# Patient Record
Sex: MALE | Race: WHITE | NOT HISPANIC OR LATINO | Employment: FULL TIME | URBAN - METROPOLITAN AREA
[De-identification: names, ages, dates, MRNs, and addresses within clinical notes are randomized per-mention and may not be internally consistent; named-entity substitution may affect disease eponyms.]

---

## 2021-01-12 ENCOUNTER — TELEPHONE (OUTPATIENT)
Dept: ENDOCRINOLOGY | Facility: HOSPITAL | Age: 61
End: 2021-01-12

## 2021-01-12 NOTE — TELEPHONE ENCOUNTER
----- Message from Esme Merritt sent at 1/11/2021  4:24 PM EST -----  Patient would like to schedule a new patient appt    [phone]

## 2021-04-08 ENCOUNTER — OFFICE VISIT (OUTPATIENT)
Dept: ENDOCRINOLOGY | Facility: HOSPITAL | Age: 61
End: 2021-04-08
Payer: COMMERCIAL

## 2021-04-08 VITALS
WEIGHT: 235.8 LBS | HEART RATE: 82 BPM | SYSTOLIC BLOOD PRESSURE: 110 MMHG | DIASTOLIC BLOOD PRESSURE: 64 MMHG | HEIGHT: 73 IN | BODY MASS INDEX: 31.25 KG/M2

## 2021-04-08 DIAGNOSIS — E89.6 H/O TOTAL ADRENALECTOMY (HCC): ICD-10-CM

## 2021-04-08 DIAGNOSIS — E27.40 ADRENAL INSUFFICIENCY (HCC): ICD-10-CM

## 2021-04-08 DIAGNOSIS — E89.0 POSTOPERATIVE HYPOTHYROIDISM: Primary | ICD-10-CM

## 2021-04-08 DIAGNOSIS — E11.65 TYPE 2 DIABETES MELLITUS WITH HYPERGLYCEMIA, WITHOUT LONG-TERM CURRENT USE OF INSULIN (HCC): ICD-10-CM

## 2021-04-08 DIAGNOSIS — Z86.39 HISTORY OF MULTINODULAR GOITER: ICD-10-CM

## 2021-04-08 PROCEDURE — 99244 OFF/OP CNSLTJ NEW/EST MOD 40: CPT | Performed by: INTERNAL MEDICINE

## 2021-04-08 RX ORDER — HYDROCORTISONE 10 MG/1
30 TABLET ORAL DAILY
COMMUNITY
End: 2021-04-08 | Stop reason: SDUPTHER

## 2021-04-08 RX ORDER — CLOPIDOGREL BISULFATE 75 MG/1
75 TABLET ORAL DAILY
COMMUNITY

## 2021-04-08 RX ORDER — HYDROCORTISONE 10 MG/1
30 TABLET ORAL DAILY
Qty: 300 TABLET | Refills: 3 | Status: SHIPPED | OUTPATIENT
Start: 2021-04-08 | End: 2022-04-18

## 2021-04-08 RX ORDER — NITROGLYCERIN 0.4 MG/1
0.4 TABLET SUBLINGUAL
COMMUNITY

## 2021-04-08 RX ORDER — HYDROCORTISONE SOD SUCCINATE 100 MG
VIAL (EA) INJECTION
COMMUNITY
Start: 2021-01-11

## 2021-04-08 RX ORDER — PANTOPRAZOLE SODIUM 40 MG/1
40 TABLET, DELAYED RELEASE ORAL
COMMUNITY
End: 2021-10-11 | Stop reason: ALTCHOICE

## 2021-04-08 RX ORDER — FLUDROCORTISONE ACETATE 0.1 MG/1
0.1 TABLET ORAL DAILY
COMMUNITY
Start: 2021-03-25 | End: 2021-10-04 | Stop reason: SDUPTHER

## 2021-04-08 RX ORDER — ATORVASTATIN CALCIUM 40 MG/1
40 TABLET, FILM COATED ORAL DAILY
COMMUNITY

## 2021-04-08 RX ORDER — FUROSEMIDE 40 MG/1
40 TABLET ORAL DAILY
COMMUNITY
Start: 2021-03-31

## 2021-04-08 RX ORDER — LEVOTHYROXINE SODIUM 175 UG/1
175 TABLET ORAL DAILY
COMMUNITY
End: 2021-04-13 | Stop reason: DRUGHIGH

## 2021-04-08 RX ORDER — CARVEDILOL 12.5 MG/1
12.5 TABLET ORAL 2 TIMES DAILY WITH MEALS
COMMUNITY
End: 2021-10-11 | Stop reason: ALTCHOICE

## 2021-04-08 RX ORDER — EMPAGLIFLOZIN 10 MG/1
10 TABLET, FILM COATED ORAL EVERY MORNING
COMMUNITY
End: 2021-04-13 | Stop reason: SDUPTHER

## 2021-04-08 NOTE — PROGRESS NOTES
4/9/2021    Assessment/Plan      Diagnoses and all orders for this visit:    Postoperative hypothyroidism  -     Comprehensive metabolic panel Lab Collect  -     TSH, 3rd generation Lab Collect  -     T4, free Lab Collect  -     CBC and differential Lab Collect  -     Comprehensive metabolic panel Lab Collect; Future  -     CBC and differential Lab Collect; Future  -     TSH, 3rd generation Lab Collect; Future  -     T4, free Lab Collect; Future  -     Comprehensive metabolic panel Lab Collect  -     CBC and differential Lab Collect  -     TSH, 3rd generation Lab Collect  -     T4, free Lab Collect    Adrenal insufficiency (HCC)  -     Comprehensive metabolic panel Lab Collect  -     TSH, 3rd generation Lab Collect  -     T4, free Lab Collect  -     CBC and differential Lab Collect  -     Comprehensive metabolic panel Lab Collect; Future  -     CBC and differential Lab Collect; Future  -     TSH, 3rd generation Lab Collect; Future  -     T4, free Lab Collect; Future  -     Comprehensive metabolic panel Lab Collect  -     CBC and differential Lab Collect  -     TSH, 3rd generation Lab Collect  -     T4, free Lab Collect  -     hydrocortisone (CORTEF) 10 mg tablet; Take 3 tablets (30 mg total) by mouth daily Double if ill  Type 2 diabetes mellitus with hyperglycemia, without long-term current use of insulin (HCC)  -     Comprehensive metabolic panel Lab Collect  -     TSH, 3rd generation Lab Collect  -     T4, free Lab Collect  -     CBC and differential Lab Collect  -     Comprehensive metabolic panel Lab Collect; Future  -     CBC and differential Lab Collect; Future  -     TSH, 3rd generation Lab Collect; Future  -     T4, free Lab Collect;  Future  -     Comprehensive metabolic panel Lab Collect  -     CBC and differential Lab Collect  -     TSH, 3rd generation Lab Collect  -     T4, free Lab Collect    H/O total adrenalectomy [facility])  -     Comprehensive metabolic panel Lab Collect  -     TSH, 3rd generation Lab Collect  -     T4, free Lab Collect  -     CBC and differential Lab Collect  -     Comprehensive metabolic panel Lab Collect; Future  -     CBC and differential Lab Collect; Future  -     TSH, 3rd generation Lab Collect; Future  -     T4, free Lab Collect; Future  -     Comprehensive metabolic panel Lab Collect  -     CBC and differential Lab Collect  -     TSH, 3rd generation Lab Collect  -     T4, free Lab Collect  -     hydrocortisone (CORTEF) 10 mg tablet; Take 3 tablets (30 mg total) by mouth daily Double if ill  History of multinodular goiter  -     Comprehensive metabolic panel Lab Collect  -     TSH, 3rd generation Lab Collect  -     T4, free Lab Collect  -     CBC and differential Lab Collect  -     Comprehensive metabolic panel Lab Collect; Future  -     CBC and differential Lab Collect; Future  -     TSH, 3rd generation Lab Collect; Future  -     T4, free Lab Collect; Future  -     Comprehensive metabolic panel Lab Collect  -     CBC and differential Lab Collect  -     TSH, 3rd generation Lab Collect  -     T4, free Lab Collect    Other orders  -     Aspirin Buf,CaCarb-MgCarb-MgO, 81 MG TABS; Take 81 mg by mouth daily  -     atorvastatin (LIPITOR) 40 mg tablet; Take 40 mg by mouth daily  -     clopidogrel (PLAVIX) 75 mg tablet; Take 75 mg by mouth daily  -     fludrocortisone (FLORINEF) 0 1 mg tablet; Take 0 1 mg by mouth daily  -     furosemide (LASIX) 40 mg tablet; Take 40 mg by mouth daily  -     hydrocortisone sodium succinate, PF, (Solu-CORTEF) 100 mg SOLR; Inject 100mg (2mL) IM in case of severe vomiting illness or adrenal crisis  Dispense ACT-O-VIAL ONLY  -     pantoprazole (PROTONIX) 40 mg tablet; Take 40 mg by mouth  -     nitroglycerin (NITROSTAT) 0 4 mg SL tablet; Place 0 4 mg under the tongue  -     carvedilol (COREG) 12 5 mg tablet;  Take 12 5 mg by mouth 2 (two) times a day with meals  -     Empagliflozin (Jardiance) 10 MG TABS; Take 10 mg by mouth every morning  -     levothyroxine 175 mcg tablet; Take 175 mcg by mouth daily  -     metFORMIN (GLUCOPHAGE) 1000 MG tablet; Take 1,000 mg by mouth 2 (two) times a day with meals  -     Discontinue: hydrocortisone (CORTEF) 10 mg tablet; Take 30 mg by mouth daily        Assessment/Plan:   1  Adrenal insufficiency post bilateral adrenalectomy  He is hemodynamically stable  I will have him get blood work done now and he will continue the same hydrocortisone and Florinef for now  We went over sick day rules and when to go to the emergency room  We discussed increasing salt intake in the summer if needed when he has salt cravings due to increase in diaphoresis in the heat  He was recommended to get a new medic Alert bracelet or necklace same he has adrenal insufficiency as his previous 1 has broken  2  Hypothyroidism post thyroidectomy  He is clinically euthyroid and will continue the same levothyroxine 175 mcg daily  I will check his thyroid blood work now  3  Type 2 diabetes  He is following with his primary care physician regarding his type 2 diabetes and takes metformin  He did not continue on Jardiance due to cost issues  He will get blood work done now consisting of a CMP, CBC, TSH, and free T4  I have asked him to follow up in 6 months with blood work to be determined  But likely a CBC, CMP, TSH, and free T4       CC:   Hypothyroid, adrenal insufficiency consult    History of Present Illness     HPI: Timi Mclaughlin is a 61y o  year old male with history of  Hypothyroidism post thyroidectomy and adrenal insufficiency post bilateral adrenalectomy for evaluation/consult  He had been seeing an endocrinologist via the [facility] but had a change in insurance and could no longer follow up with this particular physician  He is here for transition of care  He had a multinodular goiter many years ago that were being monitored over time  In  January 2014, he underwent FNA which showed benign nodules  At that point, he began have hoarse voice and difficulty with swallowing so underwent a near-total thyroidectomy in March 2014  The pathology did end up being benign and he has been on thyroid hormone replacement ever since  He is currently on levothyroxine 175 mcg daily  He had been on both 200 mcg and 250 mcg tablets in the past   He denies heat or cold intolerance but will sweat easily when outside  He denies palpitation, tremors, diarrhea or constipation, insomnia, anxiety or depression  He does have some fatigue  Weight is stable  He denies diplopia  He denies compressive thyroid symptoms or difficulties with swallowing  he was noted on a CT scan in 2009 to have pulmonary lesions and bilateral adrenal masses  These masses initially group but then decreased in size and they were just being followed over time with serial studies  An MRI in December 2014 showed a left adrenal mass of 8 5 cm and a right adrenal mass of 7 7 cm both suspicious for  Metastatic disease and had enlarged significantly from a 2011 study  A biopsy of the right adrenal mass in February 2015 showed the lesion was metastatic clear cell renal carcinoma  He then underwent bilateral adrenalectomy in April 2015 along with removing  Any other metastatic disease  Of note, he had had a right kidney mass in 2007 that was 11 cm clear cell renal cell carcinoma removed via nephrectomy  He is now taking Florinef 0 1 mg daily and hydrocortisone 10 mg 3 tablets in the morning  At 1 point, he was taking 2 tablets in the morning and 1 tablet later in the day but would forget his evening dose so just switched all 3 tablets in the morning  He denies orthostatics symptoms  He denies any current lower extremity edema  He did get some lower extremity edema in the warm months when he was much more diaphoretic and would salt craving and drink V 8 juice with salt in it  He does have some fatigue  He denies nausea or abdominal pain    His skin is hyperpigmented in the sun-exposed areas but not so in the non sun-exposed areas  He reports that his skin does dark in quite a bit in the summer  He has type 2 diabetes followed by his primary care physician and currently takes metformin 1000 mg twice a day  He was started on Jardiance 10 mg daily but had to discontinue that due to cost issues  Review of Systems   Constitutional: Positive for fatigue  Negative for unexpected weight change  Has fatigue, will need coffee and HC to get going  In the summer, was retaining water as was putting salt in the V8 juice, was craving it in summer  Weight can fluctuate with edema and salt levels  HENT: Negative for hearing loss, tinnitus and trouble swallowing  Eyes: Negative for visual disturbance  No diplopia  Wears glasses for reading  Respiratory: Negative for chest tightness and shortness of breath  Cardiovascular: Negative for chest pain, palpitations and leg swelling  Gastrointestinal: Negative for abdominal pain, constipation, diarrhea and nausea  Endocrine: Positive for polyuria  Negative for cold intolerance, heat intolerance, polydipsia and polyphagia  Polyuria after diuretic in am  Nocturia 1-2 times a night  Has dry mouth at night  will sweat easily when outside  Musculoskeletal: Negative for arthralgias and back pain  Skin: Negative for rash and wound  Can get very dark in the summer of the skin  Neurological: Negative for dizziness, tremors, weakness, light-headedness, numbness and headaches  No orthostatic symptoms  Psychiatric/Behavioral: Negative for dysphoric mood and sleep disturbance  The patient is not nervous/anxious          Historical Information   Past Medical History:   Diagnosis Date    Clear cell renal cell carcinoma, right (HCC)     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    Coronary artery disease     Heart attack ([facility] [address] )     twice    Hodgkin lymphoma ([facility] [address] )     at age 32, had chemo and XRT    Hyperlipidemia      Past Surgical History:   Procedure Laterality Date    ADRENALECTOMY Bilateral     CARDIAC DEFIBRILLATOR PLACEMENT      and pacemaker    CORONARY ANGIOPLASTY WITH STENT PLACEMENT      2 stents    CORONARY ARTERY BYPASS GRAFT      X 2    NEPHRECTOMY Right     TOTAL THYROIDECTOMY       Social History   Social History     Substance and Sexual Activity   Alcohol Use Yes    Frequency: 2-4 times a month     Social History     Substance and Sexual Activity   Drug Use Never     Social History     Tobacco Use   Smoking Status Former Smoker    Years: 12 00    Types: Cigarettes    Quit date: 80    Years since quittin 2   Smokeless Tobacco Never Used     Family History:   Family History   Problem Relation Age of Onset    Heart disease Mother         has a pacemaker    Cancer Father     Diabetes type II Father     Heart disease Sister     Diabetes type II Brother     No Known Problems Brother     No Known Problems Son        Meds/Allergies   Current Outpatient Medications   Medication Sig Dispense Refill    Aspirin Buf,CaCarb-MgCarb-MgO, 81 MG TABS Take 81 mg by mouth daily      atorvastatin (LIPITOR) 40 mg tablet Take 40 mg by mouth daily      carvedilol (COREG) 12 5 mg tablet Take 12 5 mg by mouth 2 (two) times a day with meals      clopidogrel (PLAVIX) 75 mg tablet Take 75 mg by mouth daily      Empagliflozin (Jardiance) 10 MG TABS Take 10 mg by mouth every morning      fludrocortisone (FLORINEF) 0 1 mg tablet Take 0 1 mg by mouth daily      furosemide (LASIX) 40 mg tablet Take 40 mg by mouth daily      hydrocortisone (CORTEF) 10 mg tablet Take 3 tablets (30 mg total) by mouth daily Double if ill  300 tablet 3    hydrocortisone sodium succinate, PF, (Solu-CORTEF) 100 mg SOLR Inject 100mg (2mL) IM in case of severe vomiting illness or adrenal crisis    Dispense ACT-O-VIAL ONLY      levothyroxine 175 mcg tablet Take 175 mcg by mouth daily      metFORMIN (GLUCOPHAGE) 1000 MG tablet Take 1,000 mg by mouth 2 (two) times a day with meals      nitroglycerin (NITROSTAT) 0 4 mg SL tablet Place 0 4 mg under the tongue      pantoprazole (PROTONIX) 40 mg tablet Take 40 mg by mouth       No current facility-administered medications for this visit  No Known Allergies    Objective   Vitals: Blood pressure 110/64, pulse 82, height 6' 1" (1 854 m), weight 107 kg (235 lb 12 8 oz)  Invasive Devices     None                 Physical Exam  Vitals signs reviewed  Constitutional:       Appearance: Normal appearance  He is well-developed  He is obese  HENT:      Head: Normocephalic and atraumatic  Eyes:      Extraocular Movements: Extraocular movements intact  Conjunctiva/sclera: Conjunctivae normal       Comments: No lid lag, stare, proptosis, or periorbital edema  Neck:      Musculoskeletal: Normal range of motion and neck supple  Thyroid: No thyromegaly  Vascular: No carotid bruit  Comments: Healed anterior neck scar  No palpable thyroid tissue  No lymphadenopathy  Cardiovascular:      Rate and Rhythm: Normal rate and regular rhythm  Heart sounds: Normal heart sounds  No murmur  Pulmonary:      Effort: Pulmonary effort is normal       Breath sounds: Normal breath sounds  No wheezing  Abdominal:      General: Bowel sounds are normal       Palpations: Abdomen is soft  Tenderness: There is no abdominal tenderness  Musculoskeletal: Normal range of motion  General: No deformity  Right lower leg: No edema  Left lower leg: No edema  Comments: No tremor of the outstretched hands  No spinous process tenderness  No CVA tenderness  Lymphadenopathy:      Cervical: No cervical adenopathy  Skin:     General: Skin is warm and dry  Findings: No erythema or rash  Comments: Skin very dark in the sun-exposed areas  Skin lighter on the abdomen which is not sun-exposed     Neurological:      Mental Status: He is alert and oriented to person, place, and time  Deep Tendon Reflexes: Reflexes are normal and symmetric  Comments:  Deep tendon reflexes normal          The history was obtained from the review of the chart and from the patient  Lab Results:        I have no recent blood work so I have asked him to get blood work drawn as soon as possible        Future Appointments   Date Time Provider Kelly Lee   10/11/2021 10:20 AM Jeni Greer, [address] [facility]

## 2021-04-08 NOTE — PATIENT INSTRUCTIONS
Let's get blood work done now  Continue the same florinef and hydrocortisone for now  Work on getting a new medic alert necklace or bracelet for adrenal insufficiency  Continue the same levothyroxine 175 mcg daily  Follow up in 6 months with blood work

## 2021-04-09 LAB
ALBUMIN SERPL-MCNC: 4.6 G/DL (ref 3.8–4.9)
ALBUMIN/GLOB SERPL: 1.7 {RATIO} (ref 1.2–2.2)
ALP SERPL-CCNC: 60 IU/L (ref 39–117)
ALT SERPL-CCNC: 21 IU/L (ref 0–44)
AST SERPL-CCNC: 21 IU/L (ref 0–40)
BASOPHILS # BLD AUTO: 0.1 X10E3/UL (ref 0–0.2)
BASOPHILS NFR BLD AUTO: 1 %
BILIRUB SERPL-MCNC: 0.8 MG/DL (ref 0–1.2)
BUN SERPL-MCNC: 18 MG/DL (ref 8–27)
BUN/CREAT SERPL: 15 (ref 10–24)
CALCIUM SERPL-MCNC: 9 MG/DL (ref 8.6–10.2)
CHLORIDE SERPL-SCNC: 96 MMOL/L (ref 96–106)
CO2 SERPL-SCNC: 26 MMOL/L (ref 20–29)
CREAT SERPL-MCNC: 1.23 MG/DL (ref 0.76–1.27)
EOSINOPHIL # BLD AUTO: 0.2 X10E3/UL (ref 0–0.4)
EOSINOPHIL NFR BLD AUTO: 2 %
ERYTHROCYTE [DISTWIDTH] IN BLOOD BY AUTOMATED COUNT: 13.3 % (ref 11.6–15.4)
GLOBULIN SER-MCNC: 2.7 G/DL (ref 1.5–4.5)
GLUCOSE SERPL-MCNC: 283 MG/DL (ref 65–99)
HCT VFR BLD AUTO: 44.1 % (ref 37.5–51)
HGB BLD-MCNC: 15.2 G/DL (ref 13–17.7)
IMM GRANULOCYTES # BLD: 0 X10E3/UL (ref 0–0.1)
IMM GRANULOCYTES NFR BLD: 0 %
LYMPHOCYTES # BLD AUTO: 1.7 X10E3/UL (ref 0.7–3.1)
LYMPHOCYTES NFR BLD AUTO: 17 %
MCH RBC QN AUTO: 30.6 PG (ref 26.6–33)
MCHC RBC AUTO-ENTMCNC: 34.5 G/DL (ref 31.5–35.7)
MCV RBC AUTO: 89 FL (ref 79–97)
MONOCYTES # BLD AUTO: 0.4 X10E3/UL (ref 0.1–0.9)
MONOCYTES NFR BLD AUTO: 4 %
NEUTROPHILS # BLD AUTO: 7.4 X10E3/UL (ref 1.4–7)
NEUTROPHILS NFR BLD AUTO: 76 %
PLATELET # BLD AUTO: 275 X10E3/UL (ref 150–450)
POTASSIUM SERPL-SCNC: 4.7 MMOL/L (ref 3.5–5.2)
PROT SERPL-MCNC: 7.3 G/DL (ref 6–8.5)
RBC # BLD AUTO: 4.97 X10E6/UL (ref 4.14–5.8)
SL AMB EGFR AFRICAN AMERICAN: 73 ML/MIN/1.73
SL AMB EGFR NON AFRICAN AMERICAN: 63 ML/MIN/1.73
SODIUM SERPL-SCNC: 137 MMOL/L (ref 134–144)
T4 FREE SERPL-MCNC: 0.25 NG/DL (ref 0.82–1.77)
TSH SERPL DL<=0.005 MIU/L-ACNC: 30.4 UIU/ML (ref 0.45–4.5)
WBC # BLD AUTO: 9.9 X10E3/UL (ref 3.4–10.8)

## 2021-04-13 DIAGNOSIS — E89.0 POSTOPERATIVE HYPOTHYROIDISM: Primary | ICD-10-CM

## 2021-04-13 DIAGNOSIS — E11.65 TYPE 2 DIABETES MELLITUS WITH HYPERGLYCEMIA, WITHOUT LONG-TERM CURRENT USE OF INSULIN (HCC): ICD-10-CM

## 2021-04-13 RX ORDER — EMPAGLIFLOZIN 10 MG/1
10 TABLET, FILM COATED ORAL EVERY MORNING
Qty: 30 TABLET | Refills: 5 | Status: SHIPPED | OUTPATIENT
Start: 2021-04-13 | End: 2021-09-27

## 2021-04-13 RX ORDER — LEVOTHYROXINE SODIUM 0.2 MG/1
200 TABLET ORAL DAILY
Qty: 90 TABLET | Refills: 1 | Status: SHIPPED | OUTPATIENT
Start: 2021-04-13 | End: 2021-06-07 | Stop reason: SDUPTHER

## 2021-04-19 ENCOUNTER — TELEPHONE (OUTPATIENT)
Dept: ENDOCRINOLOGY | Facility: HOSPITAL | Age: 61
End: 2021-04-19

## 2021-04-19 NOTE — TELEPHONE ENCOUNTER
jardiance will make you urinate more and can lower the blood pressure so the cardiologist may need to adjust his blood pressure medicines  Did he stop the metformin?

## 2021-04-19 NOTE — TELEPHONE ENCOUNTER
Blood sugar was 318 this morning at 10:30am  Pt would like advice  He just came home from cardiology and they said bp was low  Patient is clamy and peeing more than normal  Patient started jardiance last Friday

## 2021-04-19 NOTE — TELEPHONE ENCOUNTER
Okay, good, can he check his blood sugars at least twice a day and call in a blood sugar record by next week so I can get a better idea of what his blood sugars do throughout the day  I would like him to check before and 2 hours after a meal and then to check a different meal  This way every day

## 2021-04-19 NOTE — TELEPHONE ENCOUNTER
Patient aware  He did not stop the metformin  He said he takes them both together  He said to let you know he is going to have you manage his DM for him

## 2021-04-27 ENCOUNTER — TELEPHONE (OUTPATIENT)
Dept: ENDOCRINOLOGY | Facility: HOSPITAL | Age: 61
End: 2021-04-27

## 2021-04-27 DIAGNOSIS — E11.65 TYPE 2 DIABETES MELLITUS WITH HYPERGLYCEMIA, WITHOUT LONG-TERM CURRENT USE OF INSULIN (HCC): Primary | ICD-10-CM

## 2021-04-27 RX ORDER — GLIMEPIRIDE 4 MG/1
4 TABLET ORAL
Qty: 90 TABLET | Refills: 1 | Status: SHIPPED | OUTPATIENT
Start: 2021-04-27 | End: 2021-10-11 | Stop reason: ALTCHOICE

## 2021-04-27 NOTE — TELEPHONE ENCOUNTER
Patient is getting a pacemaker on 5/5    He wants to know if you want to adjust his cortisol    Please advise

## 2021-04-27 NOTE — TELEPHONE ENCOUNTER
He needs to double his cortisol the day of ant the day after the pacemaker is placed  He can stay on the same florinef

## 2021-04-27 NOTE — TELEPHONE ENCOUNTER
Pt informed   He would like 90 day script of glimepiride sent to [id number]

## 2021-05-04 ENCOUNTER — TELEPHONE (OUTPATIENT)
Dept: ENDOCRINOLOGY | Facility: HOSPITAL | Age: 61
End: 2021-05-04

## 2021-05-11 ENCOUNTER — TELEPHONE (OUTPATIENT)
Dept: ENDOCRINOLOGY | Facility: HOSPITAL | Age: 61
End: 2021-05-11

## 2021-05-11 NOTE — TELEPHONE ENCOUNTER
His blood sugars are good on his blood sugar record from 5/5/2021 through 5/11/2021  Did he check his blood sugars when he is experiencing these symptoms in the morning?

## 2021-05-11 NOTE — TELEPHONE ENCOUNTER
Spoke to patient  He notes that he has been recording his blood sugars when this happens  He is going to write them down and call them in on Friday   He notes that his mother passed away on Sunday so he thinks this might be related to stress

## 2021-05-11 NOTE — TELEPHONE ENCOUNTER
Received call from patient  He is experiencing hot flashes, dizziness and nausea in the morning which he is questioning if this is from the Glimepiride  Blood sugar logs are on your desk as well

## 2021-05-13 ENCOUNTER — TELEPHONE (OUTPATIENT)
Dept: ENDOCRINOLOGY | Facility: HOSPITAL | Age: 61
End: 2021-05-13

## 2021-05-13 NOTE — TELEPHONE ENCOUNTER
Received voicemail from patient  He is questioning if he should take extra hydrocortisone when stressed; he reports his mother passed away this past Sunday  Please advise

## 2021-05-13 NOTE — TELEPHONE ENCOUNTER
As long as he is feeling ok, no need to increase hydrocortisone for stress, only illness  Low cortisol/adrenal can cause lightheadedness when standing up and low blood pressure and decrease in appetite, nausea  If he has these, he can double for 1-2 days

## 2021-05-25 NOTE — TELEPHONE ENCOUNTER
Call patient  I reviewed her blood sugar record from 05/17/2021 through 05/25/2021  Blood sugar record is reasonable with blood sugars anywhere from the low 100s the high 100s  At this point, I would not decrease any further diabetes medicine such as metformin or Jardiance as his blood sugars are not low enough to do that as yet  Continue these 2 medicines the same

## 2021-05-25 NOTE — TELEPHONE ENCOUNTER
Patient called in his sugars  He stopped taking his Glimepiride on 5/16 and would like you to reduce his Metformin dose   Sugars are in your bin

## 2021-06-07 DIAGNOSIS — E89.0 POSTOPERATIVE HYPOTHYROIDISM: ICD-10-CM

## 2021-06-07 RX ORDER — LEVOTHYROXINE SODIUM 0.2 MG/1
200 TABLET ORAL DAILY
Qty: 90 TABLET | Refills: 1 | Status: SHIPPED | OUTPATIENT
Start: 2021-06-07 | End: 2022-03-21

## 2021-07-14 DIAGNOSIS — E11.65 TYPE 2 DIABETES MELLITUS WITH HYPERGLYCEMIA, WITHOUT LONG-TERM CURRENT USE OF INSULIN (HCC): Primary | ICD-10-CM

## 2021-09-27 DIAGNOSIS — E11.65 TYPE 2 DIABETES MELLITUS WITH HYPERGLYCEMIA, WITHOUT LONG-TERM CURRENT USE OF INSULIN (HCC): ICD-10-CM

## 2021-09-27 RX ORDER — EMPAGLIFLOZIN 10 MG/1
TABLET, FILM COATED ORAL
Qty: 30 TABLET | Refills: 5 | Status: SHIPPED | OUTPATIENT
Start: 2021-09-27 | End: 2022-03-21

## 2021-10-04 DIAGNOSIS — E27.40 ADRENAL INSUFFICIENCY (HCC): Primary | ICD-10-CM

## 2021-10-04 RX ORDER — FLUDROCORTISONE ACETATE 0.1 MG/1
0.1 TABLET ORAL DAILY
Qty: 90 TABLET | Refills: 0 | Status: SHIPPED | OUTPATIENT
Start: 2021-10-04 | End: 2021-12-15

## 2021-10-08 ENCOUNTER — TELEPHONE (OUTPATIENT)
Dept: ENDOCRINOLOGY | Facility: HOSPITAL | Age: 61
End: 2021-10-08

## 2021-10-09 LAB
ALBUMIN SERPL-MCNC: 4.2 G/DL (ref 3.8–4.9)
ALBUMIN/GLOB SERPL: 1.1 {RATIO} (ref 1.2–2.2)
ALP SERPL-CCNC: 90 IU/L (ref 44–121)
ALT SERPL-CCNC: 17 IU/L (ref 0–44)
AST SERPL-CCNC: 24 IU/L (ref 0–40)
BASOPHILS # BLD AUTO: 0 X10E3/UL (ref 0–0.2)
BASOPHILS NFR BLD AUTO: 0 %
BILIRUB SERPL-MCNC: 0.8 MG/DL (ref 0–1.2)
BUN SERPL-MCNC: 31 MG/DL (ref 8–27)
BUN/CREAT SERPL: 25 (ref 10–24)
CALCIUM SERPL-MCNC: 9.9 MG/DL (ref 8.6–10.2)
CHLORIDE SERPL-SCNC: 91 MMOL/L (ref 96–106)
CO2 SERPL-SCNC: 22 MMOL/L (ref 20–29)
CREAT SERPL-MCNC: 1.23 MG/DL (ref 0.76–1.27)
EOSINOPHIL # BLD AUTO: 0.2 X10E3/UL (ref 0–0.4)
EOSINOPHIL NFR BLD AUTO: 2 %
ERYTHROCYTE [DISTWIDTH] IN BLOOD BY AUTOMATED COUNT: 12.9 % (ref 11.6–15.4)
GLOBULIN SER-MCNC: 3.9 G/DL (ref 1.5–4.5)
GLUCOSE SERPL-MCNC: 126 MG/DL (ref 65–99)
HCT VFR BLD AUTO: 46.4 % (ref 37.5–51)
HGB BLD-MCNC: 15.7 G/DL (ref 13–17.7)
IMM GRANULOCYTES # BLD: 0 X10E3/UL (ref 0–0.1)
IMM GRANULOCYTES NFR BLD: 0 %
LYMPHOCYTES # BLD AUTO: 1.5 X10E3/UL (ref 0.7–3.1)
LYMPHOCYTES NFR BLD AUTO: 14 %
MCH RBC QN AUTO: 29.5 PG (ref 26.6–33)
MCHC RBC AUTO-ENTMCNC: 33.8 G/DL (ref 31.5–35.7)
MCV RBC AUTO: 87 FL (ref 79–97)
MONOCYTES # BLD AUTO: 0.6 X10E3/UL (ref 0.1–0.9)
MONOCYTES NFR BLD AUTO: 6 %
NEUTROPHILS # BLD AUTO: 8 X10E3/UL (ref 1.4–7)
NEUTROPHILS NFR BLD AUTO: 78 %
PLATELET # BLD AUTO: 364 X10E3/UL (ref 150–450)
POTASSIUM SERPL-SCNC: 4.8 MMOL/L (ref 3.5–5.2)
PROT SERPL-MCNC: 8.1 G/DL (ref 6–8.5)
RBC # BLD AUTO: 5.33 X10E6/UL (ref 4.14–5.8)
SL AMB EGFR AFRICAN AMERICAN: 73 ML/MIN/1.73
SL AMB EGFR NON AFRICAN AMERICAN: 63 ML/MIN/1.73
SODIUM SERPL-SCNC: 131 MMOL/L (ref 134–144)
T4 FREE SERPL-MCNC: 2.18 NG/DL (ref 0.82–1.77)
TSH SERPL DL<=0.005 MIU/L-ACNC: 0.08 UIU/ML (ref 0.45–4.5)
WBC # BLD AUTO: 10.4 X10E3/UL (ref 3.4–10.8)

## 2021-10-11 ENCOUNTER — OFFICE VISIT (OUTPATIENT)
Dept: ENDOCRINOLOGY | Facility: HOSPITAL | Age: 61
End: 2021-10-11
Payer: COMMERCIAL

## 2021-10-11 VITALS
SYSTOLIC BLOOD PRESSURE: 116 MMHG | BODY MASS INDEX: 27.96 KG/M2 | HEART RATE: 70 BPM | WEIGHT: 211 LBS | DIASTOLIC BLOOD PRESSURE: 68 MMHG | HEIGHT: 73 IN

## 2021-10-11 DIAGNOSIS — E27.40 ADRENAL INSUFFICIENCY (HCC): ICD-10-CM

## 2021-10-11 DIAGNOSIS — E11.65 TYPE 2 DIABETES MELLITUS WITH HYPERGLYCEMIA, WITHOUT LONG-TERM CURRENT USE OF INSULIN (HCC): Primary | ICD-10-CM

## 2021-10-11 DIAGNOSIS — E89.0 POSTOPERATIVE HYPOTHYROIDISM: ICD-10-CM

## 2021-10-11 DIAGNOSIS — E89.6 H/O TOTAL ADRENALECTOMY (HCC): ICD-10-CM

## 2021-10-11 DIAGNOSIS — Z86.39 HISTORY OF MULTINODULAR GOITER: ICD-10-CM

## 2021-10-11 DIAGNOSIS — E78.2 MIXED HYPERLIPIDEMIA: ICD-10-CM

## 2021-10-11 LAB — SL AMB POCT HEMOGLOBIN AIC: 6.3 (ref ?–6.5)

## 2021-10-11 PROCEDURE — 83036 HEMOGLOBIN GLYCOSYLATED A1C: CPT | Performed by: INTERNAL MEDICINE

## 2021-10-11 PROCEDURE — 99215 OFFICE O/P EST HI 40 MIN: CPT | Performed by: INTERNAL MEDICINE

## 2021-10-11 RX ORDER — BISOPROLOL FUMARATE 5 MG/1
5 TABLET ORAL DAILY
COMMUNITY

## 2021-10-11 RX ORDER — FLASH GLUCOSE SENSOR
KIT MISCELLANEOUS
Qty: 2 EACH | Refills: 6 | Status: SHIPPED | OUTPATIENT
Start: 2021-10-11

## 2021-12-15 DIAGNOSIS — E27.40 ADRENAL INSUFFICIENCY (HCC): ICD-10-CM

## 2021-12-15 RX ORDER — FLUDROCORTISONE ACETATE 0.1 MG/1
0.1 TABLET ORAL DAILY
Qty: 30 TABLET | Refills: 2 | Status: SHIPPED | OUTPATIENT
Start: 2021-12-15 | End: 2022-03-02

## 2021-12-23 DIAGNOSIS — E11.65 TYPE 2 DIABETES MELLITUS WITH HYPERGLYCEMIA, WITHOUT LONG-TERM CURRENT USE OF INSULIN (HCC): ICD-10-CM

## 2022-03-02 DIAGNOSIS — E27.40 ADRENAL INSUFFICIENCY (HCC): ICD-10-CM

## 2022-03-02 RX ORDER — FLUDROCORTISONE ACETATE 0.1 MG/1
TABLET ORAL
Qty: 30 TABLET | Refills: 2 | Status: SHIPPED | OUTPATIENT
Start: 2022-03-02 | End: 2022-06-13

## 2022-03-19 DIAGNOSIS — E11.65 TYPE 2 DIABETES MELLITUS WITH HYPERGLYCEMIA, WITHOUT LONG-TERM CURRENT USE OF INSULIN (HCC): ICD-10-CM

## 2022-03-19 DIAGNOSIS — E89.0 POSTOPERATIVE HYPOTHYROIDISM: ICD-10-CM

## 2022-03-21 RX ORDER — LEVOTHYROXINE SODIUM 0.2 MG/1
TABLET ORAL
Qty: 90 TABLET | Refills: 1 | Status: SHIPPED | OUTPATIENT
Start: 2022-03-21 | End: 2022-04-15 | Stop reason: SDUPTHER

## 2022-03-21 RX ORDER — EMPAGLIFLOZIN 10 MG/1
TABLET, FILM COATED ORAL
Qty: 30 TABLET | Refills: 5 | Status: SHIPPED | OUTPATIENT
Start: 2022-03-21

## 2022-04-12 ENCOUNTER — OFFICE VISIT (OUTPATIENT)
Dept: ENDOCRINOLOGY | Facility: HOSPITAL | Age: 62
End: 2022-04-12
Payer: COMMERCIAL

## 2022-04-12 VITALS
WEIGHT: 224 LBS | BODY MASS INDEX: 29.69 KG/M2 | SYSTOLIC BLOOD PRESSURE: 114 MMHG | HEART RATE: 80 BPM | DIASTOLIC BLOOD PRESSURE: 70 MMHG | HEIGHT: 73 IN

## 2022-04-12 DIAGNOSIS — E78.2 MIXED HYPERLIPIDEMIA: ICD-10-CM

## 2022-04-12 DIAGNOSIS — E89.0 POSTOPERATIVE HYPOTHYROIDISM: ICD-10-CM

## 2022-04-12 DIAGNOSIS — E11.65 TYPE 2 DIABETES MELLITUS WITH HYPERGLYCEMIA, WITHOUT LONG-TERM CURRENT USE OF INSULIN (HCC): ICD-10-CM

## 2022-04-12 DIAGNOSIS — E27.40 ADRENAL INSUFFICIENCY (HCC): Primary | ICD-10-CM

## 2022-04-12 DIAGNOSIS — E89.6 H/O TOTAL ADRENALECTOMY (HCC): ICD-10-CM

## 2022-04-12 DIAGNOSIS — Z86.39 HISTORY OF MULTINODULAR GOITER: ICD-10-CM

## 2022-04-12 PROCEDURE — 99214 OFFICE O/P EST MOD 30 MIN: CPT | Performed by: INTERNAL MEDICINE

## 2022-04-12 RX ORDER — SILDENAFIL 50 MG/1
TABLET, FILM COATED ORAL
COMMUNITY
Start: 2022-04-04

## 2022-04-12 NOTE — PROGRESS NOTES
4/12/2022    Assessment/Plan      Diagnoses and all orders for this visit:    Adrenal insufficiency ([facility] )  -     HEMOGLOBIN A1C W/ EAG ESTIMATION Lab Collect; Future  -     Comprehensive metabolic panel Lab Collect; Future  -     CBC and differential Lab Collect; Future  -     T4, free Lab Collect; Future  -     TSH, 3rd generation Lab Collect; Future  -     HEMOGLOBIN A1C W/ EAG ESTIMATION Lab Collect  -     Comprehensive metabolic panel Lab Collect  -     CBC and differential Lab Collect  -     T4, free Lab Collect  -     TSH, 3rd generation Lab Collect    Type 2 diabetes mellitus with hyperglycemia, without long-term current use of insulin ([facility])  -     HEMOGLOBIN A1C W/ EAG ESTIMATION Lab Collect; Future  -     Comprehensive metabolic panel Lab Collect; Future  -     CBC and differential Lab Collect; Future  -     T4, free Lab Collect; Future  -     TSH, 3rd generation Lab Collect; Future  -     HEMOGLOBIN A1C W/ EAG ESTIMATION Lab Collect  -     Comprehensive metabolic panel Lab Collect  -     CBC and differential Lab Collect  -     T4, free Lab Collect  -     TSH, 3rd generation Lab Collect    History of multinodular goiter  -     HEMOGLOBIN A1C W/ EAG ESTIMATION Lab Collect; Future  -     Comprehensive metabolic panel Lab Collect; Future  -     CBC and differential Lab Collect; Future  -     T4, free Lab Collect; Future  -     TSH, 3rd generation Lab Collect; Future  -     HEMOGLOBIN A1C W/ EAG ESTIMATION Lab Collect  -     Comprehensive metabolic panel Lab Collect  -     CBC and differential Lab Collect  -     T4, free Lab Collect  -     TSH, 3rd generation Lab Collect    H/O total adrenalectomy ([place] [id number] )  -     HEMOGLOBIN A1C W/ EAG ESTIMATION Lab Collect; Future  -     Comprehensive metabolic panel Lab Collect; Future  -     CBC and differential Lab Collect; Future  -     T4, free Lab Collect; Future  -     TSH, 3rd generation Lab Collect;  Future  -     HEMOGLOBIN A1C W/ EAG ESTIMATION Lab Collect  - Comprehensive metabolic panel Lab Collect  -     CBC and differential Lab Collect  -     T4, free Lab Collect  -     TSH, 3rd generation Lab Collect    Mixed hyperlipidemia  -     HEMOGLOBIN A1C W/ EAG ESTIMATION Lab Collect; Future  -     Comprehensive metabolic panel Lab Collect; Future  -     CBC and differential Lab Collect; Future  -     T4, free Lab Collect; Future  -     TSH, 3rd generation Lab Collect; Future  -     HEMOGLOBIN A1C W/ EAG ESTIMATION Lab Collect  -     Comprehensive metabolic panel Lab Collect  -     CBC and differential Lab Collect  -     T4, free Lab Collect  -     TSH, 3rd generation Lab Collect    Postoperative hypothyroidism  -     HEMOGLOBIN A1C W/ EAG ESTIMATION Lab Collect; Future  -     Comprehensive metabolic panel Lab Collect; Future  -     CBC and differential Lab Collect; Future  -     T4, free Lab Collect; Future  -     TSH, 3rd generation Lab Collect; Future  -     HEMOGLOBIN A1C W/ EAG ESTIMATION Lab Collect  -     Comprehensive metabolic panel Lab Collect  -     CBC and differential Lab Collect  -     T4, free Lab Collect  -     TSH, 3rd generation Lab Collect    Other orders  -     sildenafil (VIAGRA) 50 MG tablet; TAKE 1 TABLET BY MOUTH DAILY AS NEEDED FOR ONE HOUR PRIOR TO SEXUAL ACTIVITY        Assessment/Plan:   1  Type 2 diabetes  Last hemoglobin A1c in October was 6 3 which is at goal   Blood work from this morning pending  For now he will continue the same metformin and Jardiance  2  Hypothyroidism post thyroidectomy  On last visit thyroid function tests show a significantly low TSH, his levothyroxine dose was adjusted to 200 mcg 6 days a week and have tablet on Sunday  Patient has been compliant with this  Repeat TSH is pending today  3  Adrenal insufficiency post adrenalectomy  Patient is hemodynamically stable, he is on hydrocortisone 30 mg in the morning and Florinef 0 1 mg      4  Hyperlipidemia  He will continue the same atorvastatin 40 mg daily  Six months follow-up blood work pending from this morning, will review and adjust dose of meds as indicated  CC: Diabetes Type 2, thyroid, adrenal follow-up    History of Present Illness     HPI: Chan Velásquez is a 64y o  year old male with type 2 diabetes for 5-8 years, hypothyroidism, adrenal insufficiency for follow-up visit  Was prediabetic for years prior  He is on oral agents at home and takes  Jardiance 10 mg daily and metformin 500 mg twice a day  Denies polyuria, polydipsia, polyphagia or blurry vision  Patient does wake up twice at night to urinate  He denies numbness or tingling of the feet  He denies chest pain or shortness of breath  He denies nephropathy, retinopathy, stroke and claudication but does admit to heart attack   He is a  so understands carbs and tries to limit  Hypoglycemic episodes:  Denies    The patient's last eye exam was in 1 5 years ago  Patient will make an appointment with of stomal adjust   Foot exam today was normal  Last A1C was   6 3% on 10/11/2021  Blood Sugar/Glucometer/Pump/CGM review: checking blood sugars at least once a week  Sugars in the 120-130    He has hypothyroidism post thyroidectomy for multinodular goiter  He underwent FNA in January 2014 which showed benign nodules but had a hoarse voice with difficulty swallowing so near total thyroidectomy was performed in March 2014  Pathology did end up being benign  He takes levothyroxine 200 mcg daily  He denies heat or cold intolerance, palpitation, tremors, or weight changes  He denies diarrhea or constipation, anxiety or depression, or insomnia  He is fatigued in the morning  He denies dry skin or brittle nails  He has adrenal insufficiency post bilateral adrenalectomy and takes Florinef 0 1 mg daily and hydrocortisone 10 mg 3 tablets in the morning  He tends to forget a p m  dose so he takes all his hydrocortisone in the morning    He had CT scan in 2009 demonstrating bilateral adrenal masses with pulmonary lesions  The masses did decrease in size and Zaikee Osorio but were quite large and suspicious for metastatic disease in December 2014 so biopsy of the right adrenal mass occurred February 2015 set showing metastatic clear cell renal carcinoma and he underwent bilateral adrenalectomy in April 2015  He had previously had a renal mass on the right in 2007 that was 11 cm clear cell renal cell carcinoma removed via nephrectomy at that time  He is fatigued some in the morning and appetite is decreased in the evening  He has gained 13 lb since last visit  He denies nausea or abdominal pain  He denies orthostatics symptoms  He reports that his skin is not changed in its pigmentation for the summer months  He has hyperlipidemia and takes atorvastatin 40 mg daily  He denies chest pain or shortness of breath  Review of Systems   Constitutional: Positive for unexpected weight change  Negative for appetite change, fatigue and fever  HENT: Negative for trouble swallowing  Respiratory: Negative for shortness of breath  Cardiovascular: Negative for chest pain  Gastrointestinal: Negative for abdominal pain, diarrhea, nausea and vomiting  Endocrine: Negative for polydipsia, polyphagia and polyuria  Genitourinary: Negative for difficulty urinating  Musculoskeletal: Negative for arthralgias, back pain and myalgias  Skin: Negative for rash and wound  Neurological: Negative for tremors, weakness, light-headedness and headaches  Psychiatric/Behavioral: Negative for sleep disturbance  The patient is not nervous/anxious          Historical Information   Past Medical History:   Diagnosis Date    Clear cell renal cell carcinoma, right (HCC)     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    Coronary artery disease     [facility] attack ([facility] )     twice    Hodgkin lymphoma ([facility] )     at age 32, had chemo and XRT    Hyperlipidemia      Past Surgical History:   Procedure Laterality Date    ADRENALECTOMY Bilateral     CARDIAC DEFIBRILLATOR PLACEMENT      and pacemaker    CORONARY ANGIOPLASTY WITH STENT PLACEMENT      2 stents    CORONARY ARTERY BYPASS GRAFT      X 2    NEPHRECTOMY Right     TOTAL THYROIDECTOMY       Social History   Social History     Substance and Sexual Activity   Alcohol Use Yes     Social History     Substance and Sexual Activity   Drug Use Never     Social History     Tobacco Use   Smoking Status Former Smoker    Years: 12 00    Types: Cigarettes    Quit date: 80    Years since quittin 3   Smokeless Tobacco Never Used     Family History:   Family History   Problem Relation Age of Onset    Heart disease Mother         has a pacemaker    Cancer Father     Diabetes type II Father     Heart disease Sister     Diabetes type II Brother     No Known Problems Brother     No Known Problems Son        Meds/Allergies   Current Outpatient Medications   Medication Sig Dispense Refill    Aspirin Buf,CaCarb-MgCarb-MgO, 81 MG TABS Take 81 mg by mouth daily      atorvastatin (LIPITOR) 40 mg tablet Take 40 mg by mouth daily      bisoprolol (ZEBETA) 5 mg tablet Take 5 mg by mouth daily      clopidogrel (PLAVIX) 75 mg tablet Take 75 mg by mouth daily      fludrocortisone (FLORINEF) 0 1 mg tablet TAKE 1 TABLET EVERY DAY 30 tablet 2    furosemide (LASIX) 40 mg tablet Take 40 mg by mouth daily      hydrocortisone (CORTEF) 10 mg tablet Take 3 tablets (30 mg total) by mouth daily Double if ill  300 tablet 3    hydrocortisone sodium succinate, PF, (Solu-CORTEF) 100 mg SOLR Inject 100mg (2mL) IM in case of severe vomiting illness or adrenal crisis    Dispense ACT-O-VIAL ONLY      Jardiance 10 MG TABS TAKE 1 TABLET BY MOUTH EVERY DAY IN THE MORNING 30 tablet 5    levothyroxine 200 mcg tablet TAKE 1 TABLET BY MOUTH EVERY DAY 90 tablet 1    metFORMIN (GLUCOPHAGE) 1000 MG tablet TAKE 1 TABLET BY MOUTH TWICE A DAY WITH MEALS (Patient taking differently: daily with breakfast  ) 60 tablet 5    nitroglycerin (NITROSTAT) 0 4 mg SL tablet Place 0 4 mg under the tongue      sildenafil (VIAGRA) 50 MG tablet TAKE 1 TABLET BY MOUTH DAILY AS NEEDED FOR ONE HOUR PRIOR TO SEXUAL ACTIVITY      Continuous Blood Gluc Sensor (FreeStyle Krishna 14 Day Sensor) [facility] Use to test blood sugars 3-4 times a day  Apply every 14 days  (Patient not taking: Reported on 4/12/2022 ) 2 each 6     No current facility-administered medications for this visit  Allergies   Allergen Reactions    Other Swelling       Objective   Vitals: Blood pressure 114/70, pulse 80, height 6' 1" (1 854 m), weight 102 kg (224 lb)  Invasive Devices  Report    None                 Physical Exam  Vitals reviewed  Constitutional:       General: He is not in acute distress  Appearance: He is not toxic-appearing  HENT:      Head: Normocephalic  Mouth/Throat:      Mouth: Mucous membranes are moist    Eyes:      General: No scleral icterus  Right eye: No discharge  Left eye: No discharge  Extraocular Movements: Extraocular movements intact  Cardiovascular:      Rate and Rhythm: Normal rate  Pulses: Normal pulses  no weak pulses          Dorsalis pedis pulses are 2+ on the right side and 2+ on the left side  Posterior tibial pulses are 2+ on the right side and 2+ on the left side  Heart sounds: Murmur heard  Abdominal:      General: Bowel sounds are normal  There is no distension  Palpations: Abdomen is soft  Tenderness: There is no abdominal tenderness  Musculoskeletal:      Right lower leg: No edema  Left lower leg: No edema  Feet:      Right foot:      Skin integrity: No ulcer, skin breakdown, erythema, warmth, callus or dry skin  Left foot:      Skin integrity: No ulcer, skin breakdown, erythema, warmth, callus or dry skin  Skin:     General: Skin is warm  Coloration: Skin is not jaundiced  Neurological:      General: No focal deficit present  Mental Status: He is alert and oriented to person, place, and time  Psychiatric:         Mood and Affect: Mood normal      Patient's shoes and socks removed  Right Foot/Ankle   Right Foot Inspection  Skin Exam: skin normal and skin intact  No dry skin, no warmth, no callus, no erythema, no maceration, no abnormal color, no pre-ulcer, no ulcer and no callus  Toe Exam: ROM and strength within normal limits  No swelling, no tenderness, erythema and  no right toe deformity    Sensory   Vibration: intact  Proprioception: intact  Monofilament testing: intact    Vascular  Capillary refills: < 3 seconds  The right DP pulse is 2+  The right PT pulse is 2+  Left Foot/Ankle  Left Foot Inspection  Skin Exam: skin normal and skin intact  No dry skin, no warmth, no erythema, no maceration, normal color, no pre-ulcer, no ulcer and no callus  Toe Exam: ROM and strength within normal limits  No swelling, no tenderness, no erythema and no left toe deformity  Sensory   Vibration: intact  Proprioception: intact  Monofilament testing: intact    Vascular  Capillary refills: < 3 seconds  The left DP pulse is 2+  The left PT pulse is 2+  Assign Risk Category  No deformity present  No loss of protective sensation  No weak pulses  Risk: 0        The history was obtained from the review of the chart and from the patient  Lab Results:     Hemoglobin A1c done on 10/11/2021 was 6 3%        Blood work done on 10/08/2021 showed a CMP with a glucose nonfasting of  126, BUN 31, BUN/creatinine ratio 25, sodium 131, chloride 91, but was otherwise normal       CBC is normal     Lab Results   Component Value Date    CREATININE 1 23 10/08/2021    CREATININE 1 23 04/08/2021    BUN 31 (H) 10/08/2021    K 4 8 10/08/2021    CL 91 (L) 10/08/2021    CO2 22 10/08/2021       Lab Results   Component Value Date    ALT 17 10/08/2021    AST 24 10/08/2021       Lab Results   Component Value Date    TSH 0 083 (L) 10/08/2021    FREET4 2 18 (H) 10/08/2021       No future appointments

## 2022-04-12 NOTE — PATIENT INSTRUCTIONS
We will wait for the blood work and call you with the results  No change in medicines for now  Follow up in 6 months with blood work

## 2022-04-13 LAB
ALBUMIN SERPL-MCNC: 4.5 G/DL (ref 3.8–4.8)
ALBUMIN/CREAT UR: 58 MG/G CREAT (ref 0–29)
ALBUMIN/GLOB SERPL: 1.7 {RATIO} (ref 1.2–2.2)
ALP SERPL-CCNC: 60 IU/L (ref 44–121)
ALT SERPL-CCNC: 17 IU/L (ref 0–44)
AST SERPL-CCNC: 19 IU/L (ref 0–40)
BASOPHILS # BLD AUTO: 0.1 X10E3/UL (ref 0–0.2)
BASOPHILS NFR BLD AUTO: 1 %
BILIRUB SERPL-MCNC: 0.6 MG/DL (ref 0–1.2)
BUN SERPL-MCNC: 26 MG/DL (ref 8–27)
BUN/CREAT SERPL: 23 (ref 10–24)
CALCIUM SERPL-MCNC: 8.9 MG/DL (ref 8.6–10.2)
CHLORIDE SERPL-SCNC: 99 MMOL/L (ref 96–106)
CO2 SERPL-SCNC: 23 MMOL/L (ref 20–29)
CREAT SERPL-MCNC: 1.13 MG/DL (ref 0.76–1.27)
CREAT UR-MCNC: 39.7 MG/DL
EGFR: 74 ML/MIN/1.73
EOSINOPHIL # BLD AUTO: 0.3 X10E3/UL (ref 0–0.4)
EOSINOPHIL NFR BLD AUTO: 5 %
ERYTHROCYTE [DISTWIDTH] IN BLOOD BY AUTOMATED COUNT: 12.6 % (ref 11.6–15.4)
EST. AVERAGE GLUCOSE BLD GHB EST-MCNC: 143 MG/DL
GLOBULIN SER-MCNC: 2.6 G/DL (ref 1.5–4.5)
GLUCOSE SERPL-MCNC: 120 MG/DL (ref 65–99)
HBA1C MFR BLD: 6.6 % (ref 4.8–5.6)
HCT VFR BLD AUTO: 42.3 % (ref 37.5–51)
HGB BLD-MCNC: 14.2 G/DL (ref 13–17.7)
IMM GRANULOCYTES # BLD: 0 X10E3/UL (ref 0–0.1)
IMM GRANULOCYTES NFR BLD: 0 %
LYMPHOCYTES # BLD AUTO: 2.2 X10E3/UL (ref 0.7–3.1)
LYMPHOCYTES NFR BLD AUTO: 33 %
MCH RBC QN AUTO: 29.4 PG (ref 26.6–33)
MCHC RBC AUTO-ENTMCNC: 33.6 G/DL (ref 31.5–35.7)
MCV RBC AUTO: 88 FL (ref 79–97)
MICROALBUMIN UR-MCNC: 23.2 UG/ML
MONOCYTES # BLD AUTO: 0.6 X10E3/UL (ref 0.1–0.9)
MONOCYTES NFR BLD AUTO: 9 %
NEUTROPHILS # BLD AUTO: 3.5 X10E3/UL (ref 1.4–7)
NEUTROPHILS NFR BLD AUTO: 52 %
PLATELET # BLD AUTO: 232 X10E3/UL (ref 150–450)
POTASSIUM SERPL-SCNC: 4.3 MMOL/L (ref 3.5–5.2)
PROT SERPL-MCNC: 7.1 G/DL (ref 6–8.5)
RBC # BLD AUTO: 4.83 X10E6/UL (ref 4.14–5.8)
SODIUM SERPL-SCNC: 139 MMOL/L (ref 134–144)
T4 FREE SERPL-MCNC: 1.91 NG/DL (ref 0.82–1.77)
TSH SERPL DL<=0.005 MIU/L-ACNC: 0.35 UIU/ML (ref 0.45–4.5)
WBC # BLD AUTO: 6.7 X10E3/UL (ref 3.4–10.8)

## 2022-04-15 DIAGNOSIS — E89.0 POSTOPERATIVE HYPOTHYROIDISM: ICD-10-CM

## 2022-04-15 RX ORDER — LEVOTHYROXINE SODIUM 0.2 MG/1
TABLET ORAL
Qty: 90 TABLET | Refills: 1 | Status: SHIPPED | OUTPATIENT
Start: 2022-04-15

## 2022-04-18 DIAGNOSIS — E27.40 ADRENAL INSUFFICIENCY (HCC): ICD-10-CM

## 2022-04-18 DIAGNOSIS — E89.6 H/O TOTAL ADRENALECTOMY (HCC): ICD-10-CM

## 2022-04-18 RX ORDER — HYDROCORTISONE 10 MG/1
TABLET ORAL
Qty: 90 TABLET | Refills: 13 | Status: SHIPPED | OUTPATIENT
Start: 2022-04-18

## 2022-06-24 ENCOUNTER — TELEPHONE (OUTPATIENT)
Dept: ENDOCRINOLOGY | Facility: HOSPITAL | Age: 62
End: 2022-06-24

## 2022-06-24 NOTE — TELEPHONE ENCOUNTER
Received call from patient, he was seen earlier in the week at Urgent Care and was diagnosed with pneumonia  He has been doubling his hydrocortisone doses, but today he vomited after taking his medication  Patient reports feeling week and experiencing sweating  He would like to know if he should go to the ER  Please advise

## 2022-07-12 DIAGNOSIS — E27.40 ADRENAL INSUFFICIENCY (HCC): ICD-10-CM

## 2022-07-12 RX ORDER — FLUDROCORTISONE ACETATE 0.1 MG/1
TABLET ORAL
Qty: 90 TABLET | Refills: 3 | Status: SHIPPED | OUTPATIENT
Start: 2022-07-12

## 2022-08-27 DIAGNOSIS — E11.65 TYPE 2 DIABETES MELLITUS WITH HYPERGLYCEMIA, WITHOUT LONG-TERM CURRENT USE OF INSULIN (HCC): ICD-10-CM

## 2022-09-05 DIAGNOSIS — E11.65 TYPE 2 DIABETES MELLITUS WITH HYPERGLYCEMIA, WITHOUT LONG-TERM CURRENT USE OF INSULIN (HCC): ICD-10-CM

## 2022-09-06 RX ORDER — EMPAGLIFLOZIN 10 MG/1
TABLET, FILM COATED ORAL
Qty: 30 TABLET | Refills: 5 | Status: SHIPPED | OUTPATIENT
Start: 2022-09-06

## 2022-10-17 ENCOUNTER — OFFICE VISIT (OUTPATIENT)
Dept: ENDOCRINOLOGY | Facility: HOSPITAL | Age: 62
End: 2022-10-17
Payer: COMMERCIAL

## 2022-10-17 VITALS
BODY MASS INDEX: 29.05 KG/M2 | DIASTOLIC BLOOD PRESSURE: 72 MMHG | SYSTOLIC BLOOD PRESSURE: 120 MMHG | HEIGHT: 73 IN | HEART RATE: 109 BPM | WEIGHT: 219.2 LBS

## 2022-10-17 DIAGNOSIS — E11.65 TYPE 2 DIABETES MELLITUS WITH HYPERGLYCEMIA, WITHOUT LONG-TERM CURRENT USE OF INSULIN (HCC): Primary | ICD-10-CM

## 2022-10-17 DIAGNOSIS — Z86.39 HISTORY OF MULTINODULAR GOITER: ICD-10-CM

## 2022-10-17 DIAGNOSIS — E89.0 POSTOPERATIVE HYPOTHYROIDISM: ICD-10-CM

## 2022-10-17 DIAGNOSIS — E78.2 MIXED HYPERLIPIDEMIA: ICD-10-CM

## 2022-10-17 DIAGNOSIS — E89.6 H/O TOTAL ADRENALECTOMY (HCC): ICD-10-CM

## 2022-10-17 DIAGNOSIS — E27.40 ADRENAL INSUFFICIENCY (HCC): ICD-10-CM

## 2022-10-17 PROCEDURE — 99214 OFFICE O/P EST MOD 30 MIN: CPT | Performed by: INTERNAL MEDICINE

## 2022-10-17 NOTE — PROGRESS NOTES
10/18/2022    Assessment/Plan      Diagnoses and all orders for this visit:    Type 2 diabetes mellitus with hyperglycemia, without long-term current use of insulin (HCC)  -     HEMOGLOBIN A1C W/ EAG ESTIMATION Lab Collect  -     Comprehensive metabolic panel Lab Collect  -     CBC and differential Lab Collect  -     T4, free Lab Collect  -     TSH, 3rd generation Lab Collect  -     HEMOGLOBIN A1C W/ EAG ESTIMATION Lab Collect; Future  -     Comprehensive metabolic panel Lab Collect; Future  -     Lipid panel Lab Collect Lab Collect; Future  -     Microalbumin / creatinine urine ratio Lab Collect; Future  -     T4, free Lab Collect; Future  -     TSH, 3rd generation Lab Collect; Future  -     HEMOGLOBIN A1C W/ EAG ESTIMATION Lab Collect  -     Comprehensive metabolic panel Lab Collect  -     Lipid panel Lab Collect Lab Collect  -     Microalbumin / creatinine urine ratio Lab Collect  -     T4, free Lab Collect  -     TSH, 3rd generation Lab Collect    Postoperative hypothyroidism  -     HEMOGLOBIN A1C W/ EAG ESTIMATION Lab Collect  -     Comprehensive metabolic panel Lab Collect  -     CBC and differential Lab Collect  -     T4, free Lab Collect  -     TSH, 3rd generation Lab Collect  -     HEMOGLOBIN A1C W/ EAG ESTIMATION Lab Collect; Future  -     Comprehensive metabolic panel Lab Collect; Future  -     Lipid panel Lab Collect Lab Collect; Future  -     Microalbumin / creatinine urine ratio Lab Collect; Future  -     T4, free Lab Collect; Future  -     TSH, 3rd generation Lab Collect;  Future  -     HEMOGLOBIN A1C W/ EAG ESTIMATION Lab Collect  -     Comprehensive metabolic panel Lab Collect  -     Lipid panel Lab Collect Lab Collect  -     Microalbumin / creatinine urine ratio Lab Collect  -     T4, free Lab Collect  -     TSH, 3rd generation Lab Collect    Adrenal insufficiency (HCC)  -     HEMOGLOBIN A1C W/ EAG ESTIMATION Lab Collect  -     Comprehensive metabolic panel Lab Collect  -     CBC and differential Lab Collect  -     T4, free Lab Collect  -     TSH, 3rd generation Lab Collect  -     HEMOGLOBIN A1C W/ EAG ESTIMATION Lab Collect; Future  -     Comprehensive metabolic panel Lab Collect; Future  -     Lipid panel Lab Collect Lab Collect; Future  -     Microalbumin / creatinine urine ratio Lab Collect; Future  -     T4, free Lab Collect; Future  -     TSH, 3rd generation Lab Collect; Future  -     HEMOGLOBIN A1C W/ EAG ESTIMATION Lab Collect  -     Comprehensive metabolic panel Lab Collect  -     Lipid panel Lab Collect Lab Collect  -     Microalbumin / creatinine urine ratio Lab Collect  -     T4, free Lab Collect  -     TSH, 3rd generation Lab Collect    H/O total adrenalectomy (HCC)  -     HEMOGLOBIN A1C W/ EAG ESTIMATION Lab Collect  -     Comprehensive metabolic panel Lab Collect  -     CBC and differential Lab Collect  -     T4, free Lab Collect  -     TSH, 3rd generation Lab Collect  -     HEMOGLOBIN A1C W/ EAG ESTIMATION Lab Collect; Future  -     Comprehensive metabolic panel Lab Collect; Future  -     Lipid panel Lab Collect Lab Collect; Future  -     Microalbumin / creatinine urine ratio Lab Collect; Future  -     T4, free Lab Collect; Future  -     TSH, 3rd generation Lab Collect; Future  -     HEMOGLOBIN A1C W/ EAG ESTIMATION Lab Collect  -     Comprehensive metabolic panel Lab Collect  -     Lipid panel Lab Collect Lab Collect  -     Microalbumin / creatinine urine ratio Lab Collect  -     T4, free Lab Collect  -     TSH, 3rd generation Lab Collect    History of multinodular goiter  -     HEMOGLOBIN A1C W/ EAG ESTIMATION Lab Collect  -     Comprehensive metabolic panel Lab Collect  -     CBC and differential Lab Collect  -     T4, free Lab Collect  -     TSH, 3rd generation Lab Collect  -     HEMOGLOBIN A1C W/ EAG ESTIMATION Lab Collect; Future  -     Comprehensive metabolic panel Lab Collect; Future  -     Lipid panel Lab Collect Lab Collect;  Future  -     Microalbumin / creatinine urine ratio Lab Collect; Future  -     T4, free Lab Collect; Future  -     TSH, 3rd generation Lab Collect; Future  -     HEMOGLOBIN A1C W/ EAG ESTIMATION Lab Collect  -     Comprehensive metabolic panel Lab Collect  -     Lipid panel Lab Collect Lab Collect  -     Microalbumin / creatinine urine ratio Lab Collect  -     T4, free Lab Collect  -     TSH, 3rd generation Lab Collect    Mixed hyperlipidemia  -     HEMOGLOBIN A1C W/ EAG ESTIMATION Lab Collect  -     Comprehensive metabolic panel Lab Collect  -     CBC and differential Lab Collect  -     T4, free Lab Collect  -     TSH, 3rd generation Lab Collect  -     HEMOGLOBIN A1C W/ EAG ESTIMATION Lab Collect; Future  -     Comprehensive metabolic panel Lab Collect; Future  -     Lipid panel Lab Collect Lab Collect; Future  -     Microalbumin / creatinine urine ratio Lab Collect; Future  -     T4, free Lab Collect; Future  -     TSH, 3rd generation Lab Collect; Future  -     HEMOGLOBIN A1C W/ EAG ESTIMATION Lab Collect  -     Comprehensive metabolic panel Lab Collect  -     Lipid panel Lab Collect Lab Collect  -     Microalbumin / creatinine urine ratio Lab Collect  -     T4, free Lab Collect  -     TSH, 3rd generation Lab Collect        Assessment/Plan:  1  Type 2 diabetes  I have no recent blood work, I have asked him to get his blood work done now  He will continue the same Jardiance and metformin  If his blood sugars are elevated, we may have to increase his metformin again  He will continue to check his blood sugars at least once a day  2  Hypothyroidism post thyroidectomy  I have no recent blood work  I have asked him to get his blood work done now  He is clinically euthyroid  He will continue the same levothyroxine dosage  3  Adrenal insufficiency post bilateral adrenalectomy  He appears hemodynamically stable with a normal blood pressure  I will check a CMP to evaluate his sodium and potassium    He will continue the same hydrocortisone and Florinef for now     4  Hyperlipidemia  He will continue the same atorvastatin 40 mg daily  I will check a lipid profile with his next visit  I have asked him to get blood work done now consisting of a hemoglobin A1c, CMP, CBC, TSH, and free T4  I have asked him to follow up in 6 months with preceding hemoglobin A1c, CMP, TSH, free T4, lipid panel, and urine microalbumin to creatinine ratio  CC: Diabetes type 2, thyroid, adrenal follow-up    History of Present Illness     HPI: Gerald Moreno is a 64y o  year old male with type 2 diabetes for 6-9 years  He is on oral agents at home and takes Jardiance 10 mg daily and metformin 500 mg in the pm  He denies any polyuria, polyphagia, and blurry vision  He has some polydipsia overnight and twice a night nocturia  He denies extremity paresthesias  He denies chest pain or shortness of breath  He denies nephropathy, retinopathy, stroke and claudication but does admit to neuropathy and heart attack  Hypoglycemic episodes: No never  The patient's last eye exam was 2 years ago  The patient's last foot exam was in April 2022 at endocrine office visit  Last A1C was   Lab Results   Component Value Date    HGBA1C 6 6 (H) 04/12/2022     Blood Sugar/Glucometer/Pump/CGM review: checks blood sugars once daily in am  Average 120-150 in the am  will be over 200 when off diet  He has hypothyroidism post thyroidectomy for a multinodular goiter  He underwent FNA in January 2014 showing benign nodules but was hoarse with difficulty swallowing so near total thyroidectomy was performed in March 2014  Pathology was benign  Takes thyroid hormone for replacement purposes and takes levothyroxine 200 mcg 6 days a week, none on sunday  He denies heat or cold intolerance, palpitation, or tremors  Weight is 5 lb less than April 2022  If he does not sleep 10 hours, he will be fatigued  He denies insomnia, diarrhea or constipation    He has some dry skin at times but no brittle nails  He has adrenal insufficiency post adrenalectomy  He had bilateral adrenal masses with pulmonary lesions on CT scan in 2009  Both masses were quite large and suspicious for metastatic disease so biopsy was done in February 2015 of the right adrenal mass showing metastatic clear cell renal carcinoma  He then underwent bilateral adrenalectomy in April 2015  He has been on Florinef 0 1 mg and hydrocortisone 10 mg 3 tablets in the morning and none in the evening as he tends to forget a p m  dosage  He thus takes all his hydrocortisone in the morning  He denies orthostatics symptoms  He does have fatigue  He denies nausea or abdominal pain  He denies hyperpigmentation  He has hyperlipidemia and takes atorvastatin 40 mg daily  He denies chest pain or shortness of breath  Review of Systems   Constitutional: Positive for fatigue  Negative for unexpected weight change  Weight 5 lb less than April 2022  will be fatigued if not sleeping 10 hours a night  HENT: Negative for trouble swallowing  Eyes: Negative for visual disturbance  Respiratory: Negative for chest tightness and shortness of breath  Cardiovascular: Negative for chest pain and palpitations  Gastrointestinal: Negative for abdominal pain, constipation, diarrhea and nausea  Endocrine: Positive for polydipsia  Negative for cold intolerance, heat intolerance, polyphagia and polyuria  Nocturia 2 times a night  thirsty overnight  Musculoskeletal: Positive for arthralgias  Gout left 1st MP joint  For xray  Skin: Negative for wound  Some dry skin at times  No brittle nails  No hyperpigmentation  Neurological: Negative for dizziness, tremors, light-headedness, numbness and headaches  No orthostatic symptoms  Psychiatric/Behavioral: Negative for sleep disturbance  Sleeping 10 hours a night          Historical Information   Past Medical History:   Diagnosis Date   • Clear cell renal cell carcinoma, 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 [address] )     twice   • Hodgkin lymphoma ([facility] [address] )     at age 32, had chemo and XRT   • Hyperlipidemia      Past Surgical History:   Procedure Laterality Date   • ADRENALECTOMY Bilateral    • CARDIAC DEFIBRILLATOR PLACEMENT      and pacemaker   • CORONARY ANGIOPLASTY WITH STENT PLACEMENT      2 stents   • CORONARY ARTERY BYPASS GRAFT      X 2   • NEPHRECTOMY Right    • TOTAL THYROIDECTOMY       Social History   Social History     Substance and Sexual Activity   Alcohol Use Yes     Social History     Substance and Sexual Activity   Drug Use Never     Social History     Tobacco Use   Smoking Status Former Smoker   • Years: 12 00   • Types: Cigarettes   • Quit date:    • Years since quittin 8   Smokeless Tobacco Never Used     Family History:   Family History   Problem Relation Age of Onset   • Heart disease Mother         has a pacemaker   • Cancer Father    • Diabetes type II Father    • Heart disease Sister    • Diabetes type II Brother    • No Known Problems Brother    • No Known Problems Son        Meds/Allergies   Current Outpatient Medications   Medication Sig Dispense Refill   • Aspirin Buf,CaCarb-MgCarb-MgO, 81 MG TABS Take 81 mg by mouth daily     • atorvastatin (LIPITOR) 40 mg tablet Take 40 mg by mouth daily     • bisoprolol (ZEBETA) 5 mg tablet Take 5 mg by mouth daily     • clopidogrel (PLAVIX) 75 mg tablet Take 75 mg by mouth daily     • fludrocortisone (FLORINEF) 0 1 mg tablet TAKE 1 TABLET BY MOUTH EVERY DAY 90 tablet 3   • furosemide (LASIX) 40 mg tablet Take 40 mg by mouth daily     • hydrocortisone (CORTEF) 10 mg tablet TAKE 3 TABLETS (30 MG TOTAL) BY MOUTH DAILY -DOUBLE IF ILL  90 tablet 13   • hydrocortisone sodium succinate, PF, (Solu-CORTEF) 100 mg SOLR Inject 100mg (2mL) IM in case of severe vomiting illness or adrenal crisis    Dispense ACT-O-VIAL ONLY     • Jardiance 10 MG TABS TAKE 1 TABLET BY MOUTH EVERY DAY IN THE MORNING 30 tablet 5   • levothyroxine 200 mcg tablet Take 1 tablet 6 days a week  90 tablet 1   • metFORMIN (GLUCOPHAGE) 1000 MG tablet Take 1 tablet (1,000 mg total) by mouth daily with breakfast (Patient taking differently: Take 500 mg by mouth daily with dinner) 90 tablet 2   • nitroglycerin (NITROSTAT) 0 4 mg SL tablet Place 0 4 mg under the tongue     • sildenafil (VIAGRA) 50 MG tablet TAKE 1 TABLET BY MOUTH DAILY AS NEEDED FOR ONE HOUR PRIOR TO SEXUAL ACTIVITY     • Continuous Blood Gluc Sensor (NexPlanarStyle Krishna 14 Day Sensor) [facility] [place] Use to test blood sugars 3-4 times a day  Apply every 14 days  (Patient not taking: No sig reported) 2 each 6     No current facility-administered medications for this visit  Allergies   Allergen Reactions   • Other Swelling       Objective   Vitals: Blood pressure 120/72, pulse (!) 109, height 6' 1" (1 854 m), weight 99 4 kg (219 lb 3 2 oz)  Invasive Devices  Report    None                 Physical Exam  Vitals reviewed  Constitutional:       Appearance: Normal appearance  He is well-developed  HENT:      Head: Normocephalic and atraumatic  Comments: No moon faces  Eyes:      Extraocular Movements: Extraocular movements intact  Conjunctiva/sclera: Conjunctivae normal       Comments: No lid lag, stare, proptosis, or periorbital edema   Neck:      Thyroid: No thyromegaly  Vascular: No carotid bruit  Comments: Healed anterior neck scar  No palpable thyroid tissue  No supraclavicular fat pad or buffalo hump  Cardiovascular:      Rate and Rhythm: Normal rate and regular rhythm  Heart sounds: Normal heart sounds  No murmur heard  Pulmonary:      Effort: Pulmonary effort is normal       Breath sounds: Normal breath sounds  No wheezing  Abdominal:      Palpations: Abdomen is soft  Musculoskeletal:         General: No deformity  Normal range of motion        Cervical back: Normal range of motion and neck supple  Right lower leg: No edema  Left lower leg: No edema  Comments: No tremor of the outstretched hands  No spinous process tenderness  No CVA tenderness  Lymphadenopathy:      Cervical: No cervical adenopathy  Skin:     General: Skin is warm and dry  Findings: No erythema or rash  Comments: No hyperpigmentation of non sun-exposed skin  Neurological:      Mental Status: He is alert and oriented to person, place, and time  Deep Tendon Reflexes: Reflexes are normal and symmetric  Comments: Patellar deep tendon reflexes normal          The history was obtained from the review of the chart and from the patient  Lab Results:     I have no recent blood work  He did not yet get his blood work done for this visit       Most recent Alc is  Lab Results   Component Value Date    HGBA1C 6 6 (H) 04/12/2022               Lab Results   Component Value Date    CREATININE 1 13 04/12/2022    CREATININE 1 23 10/08/2021    CREATININE 1 23 04/08/2021    BUN 26 04/12/2022    K 4 3 04/12/2022    CL 99 04/12/2022    CO2 23 04/12/2022     eGFR   Date Value Ref Range Status   04/12/2022 74 >59 mL/min/1 73 Final           Lab Results   Component Value Date    ALT 17 04/12/2022    AST 19 04/12/2022       Lab Results   Component Value Date    TSH 0 348 (L) 04/12/2022    FREET4 1 91 (H) 04/12/2022             Future Appointments   Date Time Provider Kelly Lee   4/19/2023  9:00 AM Kira Soliman MD [facility]

## 2022-10-17 NOTE — PATIENT INSTRUCTIONS
Let's get blood work done now  No change in medications for now  Follow up in 6 months with blood work

## 2022-10-19 LAB
ALBUMIN SERPL-MCNC: 4.5 G/DL (ref 3.8–4.8)
ALBUMIN/GLOB SERPL: 1.4 {RATIO} (ref 1.2–2.2)
ALP SERPL-CCNC: 81 IU/L (ref 44–121)
ALT SERPL-CCNC: 16 IU/L (ref 0–44)
AST SERPL-CCNC: 22 IU/L (ref 0–40)
BASOPHILS # BLD AUTO: 0 X10E3/UL (ref 0–0.2)
BASOPHILS NFR BLD AUTO: 1 %
BILIRUB SERPL-MCNC: 1.3 MG/DL (ref 0–1.2)
BUN SERPL-MCNC: 24 MG/DL (ref 8–27)
BUN/CREAT SERPL: 18 (ref 10–24)
CALCIUM SERPL-MCNC: 9.3 MG/DL (ref 8.6–10.2)
CHLORIDE SERPL-SCNC: 93 MMOL/L (ref 96–106)
CO2 SERPL-SCNC: 23 MMOL/L (ref 20–29)
CREAT SERPL-MCNC: 1.37 MG/DL (ref 0.76–1.27)
EGFR: 59 ML/MIN/1.73
EOSINOPHIL # BLD AUTO: 0.3 X10E3/UL (ref 0–0.4)
EOSINOPHIL NFR BLD AUTO: 6 %
ERYTHROCYTE [DISTWIDTH] IN BLOOD BY AUTOMATED COUNT: 12.9 % (ref 11.6–15.4)
EST. AVERAGE GLUCOSE BLD GHB EST-MCNC: 143 MG/DL
GLOBULIN SER-MCNC: 3.2 G/DL (ref 1.5–4.5)
GLUCOSE SERPL-MCNC: 172 MG/DL (ref 70–99)
HBA1C MFR BLD: 6.6 % (ref 4.8–5.6)
HCT VFR BLD AUTO: 47.5 % (ref 37.5–51)
HGB BLD-MCNC: 16.2 G/DL (ref 13–17.7)
IMM GRANULOCYTES # BLD: 0 X10E3/UL (ref 0–0.1)
IMM GRANULOCYTES NFR BLD: 0 %
LYMPHOCYTES # BLD AUTO: 1.2 X10E3/UL (ref 0.7–3.1)
LYMPHOCYTES NFR BLD AUTO: 27 %
MCH RBC QN AUTO: 30.5 PG (ref 26.6–33)
MCHC RBC AUTO-ENTMCNC: 34.1 G/DL (ref 31.5–35.7)
MCV RBC AUTO: 89 FL (ref 79–97)
MONOCYTES # BLD AUTO: 0.7 X10E3/UL (ref 0.1–0.9)
MONOCYTES NFR BLD AUTO: 15 %
NEUTROPHILS # BLD AUTO: 2.3 X10E3/UL (ref 1.4–7)
NEUTROPHILS NFR BLD AUTO: 51 %
PLATELET # BLD AUTO: 222 X10E3/UL (ref 150–450)
POTASSIUM SERPL-SCNC: 4.7 MMOL/L (ref 3.5–5.2)
PROT SERPL-MCNC: 7.7 G/DL (ref 6–8.5)
RBC # BLD AUTO: 5.32 X10E6/UL (ref 4.14–5.8)
SODIUM SERPL-SCNC: 133 MMOL/L (ref 134–144)
T4 FREE SERPL-MCNC: 1.86 NG/DL (ref 0.82–1.77)
TSH SERPL DL<=0.005 MIU/L-ACNC: 0.39 UIU/ML (ref 0.45–4.5)
WBC # BLD AUTO: 4.5 X10E3/UL (ref 3.4–10.8)

## 2022-10-21 DIAGNOSIS — E89.0 POSTOPERATIVE HYPOTHYROIDISM: ICD-10-CM

## 2022-10-21 RX ORDER — LEVOTHYROXINE SODIUM 0.2 MG/1
TABLET ORAL
Qty: 90 TABLET | Refills: 1 | Status: SHIPPED | OUTPATIENT
Start: 2022-10-21

## 2022-12-11 DIAGNOSIS — E89.0 POSTOPERATIVE HYPOTHYROIDISM: ICD-10-CM

## 2022-12-12 RX ORDER — LEVOTHYROXINE SODIUM 0.2 MG/1
TABLET ORAL
Qty: 72 TABLET | Refills: 3 | Status: SHIPPED | OUTPATIENT
Start: 2022-12-12

## 2023-01-04 DIAGNOSIS — E89.0 POSTOPERATIVE HYPOTHYROIDISM: ICD-10-CM

## 2023-01-04 RX ORDER — LEVOTHYROXINE SODIUM 0.2 MG/1
TABLET ORAL
Qty: 66 TABLET | Refills: 1 | Status: SHIPPED | OUTPATIENT
Start: 2023-01-04

## 2023-01-26 ENCOUNTER — TELEPHONE (OUTPATIENT)
Dept: ENDOCRINOLOGY | Facility: HOSPITAL | Age: 63
End: 2023-01-26

## 2023-01-26 NOTE — TELEPHONE ENCOUNTER
Received voicemail from patient   He will be having a colonoscopy on Monday, would you like him to make any medication adjustments prior and day of test?

## 2023-02-27 DIAGNOSIS — E89.0 POSTOPERATIVE HYPOTHYROIDISM: ICD-10-CM

## 2023-02-27 RX ORDER — LEVOTHYROXINE SODIUM 0.2 MG/1
TABLET ORAL
Qty: 66 TABLET | Refills: 2 | Status: SHIPPED | OUTPATIENT
Start: 2023-02-27

## 2023-03-27 DIAGNOSIS — E11.65 TYPE 2 DIABETES MELLITUS WITH HYPERGLYCEMIA, WITHOUT LONG-TERM CURRENT USE OF INSULIN (HCC): ICD-10-CM

## 2023-03-27 RX ORDER — EMPAGLIFLOZIN 10 MG/1
TABLET, FILM COATED ORAL
Qty: 30 TABLET | Refills: 5 | Status: SHIPPED | OUTPATIENT
Start: 2023-03-27

## 2023-04-03 ENCOUNTER — TELEPHONE (OUTPATIENT)
Dept: ENDOCRINOLOGY | Facility: HOSPITAL | Age: 63
End: 2023-04-03

## 2023-04-03 NOTE — TELEPHONE ENCOUNTER
Received call from patient  He is having a lung biospy tomorrow and would like to know how he should increase his hydrocortisone? Patient states he will be receiving a 100mg hydrocortisone injection prior to the procedure  Please advise

## 2023-04-22 DIAGNOSIS — E89.0 POSTOPERATIVE HYPOTHYROIDISM: ICD-10-CM

## 2023-04-24 RX ORDER — LEVOTHYROXINE SODIUM 0.2 MG/1
TABLET ORAL
Qty: 66 TABLET | Refills: 3 | Status: SHIPPED | OUTPATIENT
Start: 2023-04-24

## 2023-05-19 DIAGNOSIS — E27.40 ADRENAL INSUFFICIENCY (HCC): ICD-10-CM

## 2023-05-19 DIAGNOSIS — E89.6 H/O TOTAL ADRENALECTOMY (HCC): ICD-10-CM

## 2023-05-19 RX ORDER — HYDROCORTISONE 10 MG/1
TABLET ORAL
Qty: 90 TABLET | Refills: 13 | Status: SHIPPED | OUTPATIENT
Start: 2023-05-19

## 2023-06-14 DIAGNOSIS — E27.40 ADRENAL INSUFFICIENCY (HCC): ICD-10-CM

## 2023-06-14 RX ORDER — FLUDROCORTISONE ACETATE 0.1 MG/1
TABLET ORAL
Qty: 30 TABLET | Refills: 11 | Status: SHIPPED | OUTPATIENT
Start: 2023-06-14

## 2023-07-09 DIAGNOSIS — E27.40 ADRENAL INSUFFICIENCY (HCC): ICD-10-CM

## 2023-07-10 RX ORDER — FLUDROCORTISONE ACETATE 0.1 MG/1
TABLET ORAL
Qty: 90 TABLET | Refills: 4 | Status: SHIPPED | OUTPATIENT
Start: 2023-07-10

## 2023-09-23 DIAGNOSIS — E11.65 TYPE 2 DIABETES MELLITUS WITH HYPERGLYCEMIA, WITHOUT LONG-TERM CURRENT USE OF INSULIN (HCC): ICD-10-CM

## 2023-09-25 RX ORDER — EMPAGLIFLOZIN 10 MG/1
TABLET, FILM COATED ORAL
Qty: 30 TABLET | Refills: 5 | Status: SHIPPED | OUTPATIENT
Start: 2023-09-25

## 2023-10-31 ENCOUNTER — OFFICE VISIT (OUTPATIENT)
Dept: ENDOCRINOLOGY | Facility: HOSPITAL | Age: 63
End: 2023-10-31
Payer: COMMERCIAL

## 2023-10-31 VITALS
SYSTOLIC BLOOD PRESSURE: 110 MMHG | OXYGEN SATURATION: 96 % | WEIGHT: 219 LBS | HEART RATE: 88 BPM | DIASTOLIC BLOOD PRESSURE: 68 MMHG | BODY MASS INDEX: 29.03 KG/M2 | HEIGHT: 73 IN

## 2023-10-31 DIAGNOSIS — E27.40 ADRENAL INSUFFICIENCY (HCC): ICD-10-CM

## 2023-10-31 DIAGNOSIS — E11.65 TYPE 2 DIABETES MELLITUS WITH HYPERGLYCEMIA, WITHOUT LONG-TERM CURRENT USE OF INSULIN (HCC): Primary | ICD-10-CM

## 2023-10-31 DIAGNOSIS — E78.2 MIXED HYPERLIPIDEMIA: ICD-10-CM

## 2023-10-31 DIAGNOSIS — E89.6 H/O TOTAL ADRENALECTOMY (HCC): ICD-10-CM

## 2023-10-31 DIAGNOSIS — E89.0 POSTOPERATIVE HYPOTHYROIDISM: ICD-10-CM

## 2023-10-31 DIAGNOSIS — Z86.39 HISTORY OF MULTINODULAR GOITER: ICD-10-CM

## 2023-10-31 PROCEDURE — 99214 OFFICE O/P EST MOD 30 MIN: CPT | Performed by: INTERNAL MEDICINE

## 2023-10-31 NOTE — ASSESSMENT & PLAN NOTE
Hemodynamically, he is stable without symptoms. His sodium and chloride are quite low, so he is going to eat a little bit more salt. His sodium may be falsely low due to the high sugars. He will continue the same hydrocortisone and Florinef for now. I will repeat a BMP in 1 to 2 weeks.

## 2023-10-31 NOTE — PROGRESS NOTES
10/31/2023    Assessment/Plan     1. Type 2 diabetes mellitus with hyperglycemia, without long-term current use of insulin (HCC)  Assessment & Plan:  Hemoglobin A1c has gone up markedly from a 6.3 to 9.4%. This is likely due to. Having stopped his metformin dosage for unclear reasons just because so to speak. For now, I will restart his metformin 500 mg once daily for a week and then 1000 mg once daily at breakfast. He will continue the same Jardiance 10 mg daily. He will work on dietary changes and exercise. He will start checking his blood sugars more frequently up to once a day. I have asked him to follow up with the ophthalmologist.     Orders:  -     HEMOGLOBIN A1C W/ [facility]; Future; Expected date: 04/15/2024  -     Comprehensive metabolic panel Lab Collect; Future; Expected date: 04/15/2024  -     TSH, 3rd generation Lab Collect; Future; Expected date: 04/15/2024  -     T4, free Lab Collect; Future; Expected date: 04/15/2024  -     Lipid Panel with Direct LDL reflex Lab Collect; Future; Expected date: 04/15/2024  -     Albumin / creatinine urine ratio; Future; Expected date: 04/15/2024  -     HEMOGLOBIN A1C W/ EAG ESTIMATION Lab Collect  -     Comprehensive metabolic panel Lab Collect  -     TSH, 3rd generation Lab Collect  -     T4, free Lab Collect  -     Lipid Panel with Direct LDL reflex Lab Collect  -     Albumin / creatinine urine ratio    2. Adrenal insufficiency (HCC)  Assessment & Plan:  Hemodynamically, he is stable without symptoms. His sodium and chloride are quite low, so he is going to eat a little bit more salt. His sodium may be falsely low due to the high sugars. He will continue the same hydrocortisone and Florinef for now. I will repeat a BMP in 1 to 2 weeks. Orders:  -     Basic metabolic panel Lab Collect; Future; Expected date: 11/07/2023  -     Basic metabolic panel Lab Collect  -     HEMOGLOBIN A1C W/ EAG ESTIMATION Lab Collect;  Future; Expected date: 04/15/2024  -     Comprehensive metabolic panel Lab Collect; Future; Expected date: 04/15/2024  -     TSH, 3rd generation Lab Collect; Future; Expected date: 04/15/2024  -     T4, free Lab Collect; Future; Expected date: 04/15/2024  -     Lipid Panel with Direct LDL reflex Lab Collect; Future; Expected date: 04/15/2024  -     Albumin / creatinine urine ratio; Future; Expected date: 04/15/2024  -     HEMOGLOBIN A1C W/ EAG ESTIMATION Lab Collect  -     Comprehensive metabolic panel Lab Collect  -     TSH, 3rd generation Lab Collect  -     T4, free Lab Collect  -     Lipid Panel with Direct LDL reflex Lab Collect  -     Albumin / creatinine urine ratio    3. Postoperative hypothyroidism  Assessment & Plan:  Most recent thyroid function studies are normal. He will continue the same levothyroxine 200 mcg 1 tablet 5 days a week, half tablet on Saturday and none on Sunday. Orders:  -     HEMOGLOBIN A1C W/ EAG ESTIMATION Lab Collect; Future; Expected date: 04/15/2024  -     Comprehensive metabolic panel Lab Collect; Future; Expected date: 04/15/2024  -     TSH, 3rd generation Lab Collect; Future; Expected date: 04/15/2024  -     T4, free Lab Collect; Future; Expected date: 04/15/2024  -     Lipid Panel with Direct LDL reflex Lab Collect; Future; Expected date: 04/15/2024  -     Albumin / creatinine urine ratio; Future; Expected date: 04/15/2024  -     HEMOGLOBIN A1C W/ EAG ESTIMATION Lab Collect  -     Comprehensive metabolic panel Lab Collect  -     TSH, 3rd generation Lab Collect  -     T4, free Lab Collect  -     Lipid Panel with Direct LDL reflex Lab Collect  -     Albumin / creatinine urine ratio    4. H/O total adrenalectomy ([address])  -     HEMOGLOBIN A1C W/ EAG ESTIMATION Lab Collect; Future; Expected date: 04/15/2024  -     Comprehensive metabolic panel Lab Collect; Future; Expected date: 04/15/2024  -     TSH, 3rd generation Lab Collect; Future; Expected date: 04/15/2024  -     T4, free Lab Collect;  Future; Expected date: 04/15/2024  -     Lipid Panel with Direct LDL reflex Lab Collect; Future; Expected date: 04/15/2024  -     Albumin / creatinine urine ratio; Future; Expected date: 04/15/2024  -     HEMOGLOBIN A1C W/ EAG ESTIMATION Lab Collect  -     Comprehensive metabolic panel Lab Collect  -     TSH, 3rd generation Lab Collect  -     T4, free Lab Collect  -     Lipid Panel with Direct LDL reflex Lab Collect  -     Albumin / creatinine urine ratio    5. History of multinodular goiter  -     HEMOGLOBIN A1C W/ EAG ESTIMATION Lab Collect; Future; Expected date: 04/15/2024  -     Comprehensive metabolic panel Lab Collect; Future; Expected date: 04/15/2024  -     TSH, 3rd generation Lab Collect; Future; Expected date: 04/15/2024  -     T4, free Lab Collect; Future; Expected date: 04/15/2024  -     Lipid Panel with Direct LDL reflex Lab Collect; Future; Expected date: 04/15/2024  -     Albumin / creatinine urine ratio; Future; Expected date: 04/15/2024  -     HEMOGLOBIN A1C W/ EAG ESTIMATION Lab Collect  -     Comprehensive metabolic panel Lab Collect  -     TSH, 3rd generation Lab Collect  -     T4, free Lab Collect  -     Lipid Panel with Direct LDL reflex Lab Collect  -     Albumin / creatinine urine ratio    6. Mixed hyperlipidemia  Assessment & Plan:  He will continue the same atorvastatin 40 mg daily. He does follow with cardiology, and I will repeat a lipid profile with his next visit. I have asked him to follow up in 6 months with preceding hemoglobin A1c, CMP, TSH, free T4, lipid panel, and urine microalbumin to creatinine ratio. Orders:  -     HEMOGLOBIN A1C W/ EAG ESTIMATION Lab Collect; Future; Expected date: 04/15/2024  -     Comprehensive metabolic panel Lab Collect; Future; Expected date: 04/15/2024  -     TSH, 3rd generation Lab Collect; Future; Expected date: 04/15/2024  -     T4, free Lab Collect; Future; Expected date: 04/15/2024  -     Lipid Panel with Direct LDL reflex Lab Collect;  Future; Expected date: 04/15/2024  -     Albumin / creatinine urine ratio; Future; Expected date: 04/15/2024  -     HEMOGLOBIN A1C W/ EAG ESTIMATION Lab Collect  -     Comprehensive metabolic panel Lab Collect  -     TSH, 3rd generation Lab Collect  -     T4, free Lab Collect  -     Lipid Panel with Direct LDL reflex Lab Collect  -     Albumin / creatinine urine ratio           CC: Diabetes type II, thyroid, adrenal, lipid follow-up    History of Present Illness     HPI: Darrion Sanchez is a 58y.o. year old male with type 2 diabetes for 6 to 9 years, hypothyroidism post thyroidectomy, adrenal insufficiency post bilateral adrenalectomy, and hyperlipidemia for follow-up visit. Diabetic complications include neuropathy and heart attack. He denies nephropathy, retinopathy, stroke, or claudication. The patient states that he stopped taking his metformin because he was "silly."  He continues to take Jardiance 10 mg daily. He reports that his current blood sugar is 172. He mentions that he assesses his blood glucose just once today before he comes to the facility today. He assessed his blood sugar today around 8:00 in the morning before he ate his breakfast. He usually eats banana and coffee for breakfast.     The patient reports that he pees a lot after his medication. He notes that he is taking furosemide 40mg. He states that he pees once in the morning three times while at night he usually wakes up twice to pee. He admits that he drinks a lot and experiencing polydipsia in the morning. He reports that every time he gets up to pee, he usually has a bottle of water because his mouth is dry, and he is a mouth breather. He states that his feet are feeling cold. He denies his feet feels numb and tingly. He feels fatigue in the morning and then he takes his pills, and he feels better.  He states he feels "Like the ocean it would be." He noticed that his lower extremities are light that is not darker than usual. He does not have an episode of anxiety and depression. He has a minimal dry skin. The patient confirms that he has adrenal insufficiency because he had adrenalectomy. He mentions he is taking hydrocortisone at 10 mg 3 in the morning and Florinef 0.1 mg one of dose. The patient had thyroidectomy for the nodules. He is on levothyroxine 200 mcg 1 tablet 5 days a week, half tablet on Saturday and none on Sunday. He denies heat intolerance, cold sensitivity, palpitations, tremors, abdominal pain, diarrhea, constipation, insomnia, brittle nails, or dysphagia. The patient has hypercholesterolemia and his cardiologist put his on the Lipitor 40 mg a day, bisoprolol 5 mg, the Plavix, the baby aspirin, the furosemide 40 mg, and the nitroglycerin if you need, but he has not really been using. He denies chest pain and dyspnea. His last visit to his cardiologist was a month ago. The patient's last eye exam was in about 3 years ago. The patient's last foot exam was in 04/2023. Last A1C was   Lab Results   Component Value Date    HGBA1C 9.4 (H) 10/30/2023   . Review of Systems  The pertinent positive and negative findings are as noted in the HPI.     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    Coronary artery disease     Heart attack ([address])     twice    Hodgkin lymphoma ([address])     at age 32, had chemo and XRT    Hyperlipidemia      Past Surgical History:   Procedure Laterality Date    ADRENALECTOMY Bilateral     CARDIAC DEFIBRILLATOR PLACEMENT      and pacemaker    CORONARY ANGIOPLASTY WITH STENT PLACEMENT      2 stents    CORONARY ARTERY BYPASS GRAFT      X 2    LUNG BIOPSY      NEPHRECTOMY Right     TOTAL THYROIDECTOMY       Social History   Social History     Substance and Sexual Activity   Alcohol Use Yes     Social History     Substance and Sexual Activity   Drug Use Never     Social History     Tobacco Use Smoking Status Former    Years: 12.00    Types: Cigarettes    Quit date:     Years since quittin.8   Smokeless Tobacco Never     Family History:   Family History   Problem Relation Age of Onset    Heart disease Mother         has a pacemaker    Cancer Father     Diabetes type II Father     Heart disease Sister     Diabetes type II Brother     No Known Problems Brother     No Known Problems Son        Meds/Allergies   Current Outpatient Medications   Medication Sig Dispense Refill    Aspirin Buf,CaCarb-MgCarb-MgO, 81 MG TABS Take 81 mg by mouth daily      atorvastatin (LIPITOR) 40 mg tablet Take 40 mg by mouth daily      bisoprolol (ZEBETA) 5 mg tablet Take 5 mg by mouth daily      clopidogrel (PLAVIX) 75 mg tablet Take 75 mg by mouth daily      fludrocortisone (FLORINEF) 0.1 mg tablet TAKE 1 TABLET BY MOUTH EVERY DAY 90 tablet 4    furosemide (LASIX) 40 mg tablet Take 40 mg by mouth daily      hydrocortisone (CORTEF) 10 mg tablet TAKE 3 TABLETS BY MOUTH DAILY (DOUBLE IF ILL) 90 tablet 13    hydrocortisone sodium succinate, PF, (Solu-CORTEF) 100 mg SOLR Inject 100mg (2mL) IM in case of severe vomiting illness or adrenal crisis. Dispense ACT-O-VIAL ONLY      Jardiance 10 MG TABS tablet TAKE 1 TABLET BY MOUTH EVERY DAY IN THE MORNING 30 tablet 5    levothyroxine 200 mcg tablet TAKE 1 TABLET BY MOUTH DAILY 5 DAYS OF THE WEEK. TAKE 1/2 TABLET ON SAT AND NONE ON SUN 66 tablet 3    nitroglycerin (NITROSTAT) 0.4 mg SL tablet Place 0.4 mg under the tongue      sildenafil (VIAGRA) 50 MG tablet Take 100 mg by mouth as needed      Continuous Blood Gluc Sensor ([employer] Krishna 14 Day Sensor) [facility] [place] to test blood sugars 3-4 times a day. Apply every 14 days.  (Patient not taking: Reported on 2022) 2 each 6    metFORMIN (GLUCOPHAGE) 1000 MG tablet Take 1 tablet (1,000 mg total) by mouth daily with breakfast (Patient not taking: Reported on 10/31/2023) 90 tablet 2     No current facility-administered medications for this visit. Allergies   Allergen Reactions    Other Swelling       Objective   Vitals: Blood pressure 110/68, pulse 88, height 6' 1" (1.854 m), weight 99.3 kg (219 lb), SpO2 96 %. Invasive Devices       None                   Physical Exam  Physical exam normal with pertinent positives and negatives. EENT: No lid lags, stare, proptosis, or periorbital edema. Neck exam demonstrates a healed anterior neck scar. No palpable thyroid tissues. Respiratory: Lungs clear. Cardiovascular: Heart regular, no murmurs. Neurological: No CVA tenderness. No tremor in the outstretched hands. Patellar deep tendon reflexes normal.   Feet: Feet with no ulcerations. Peripheral pulses 2+ in the dorsalis pedis area. Hammertoes of the second through fourth toes bilaterally. Skin: Skin hyperpigmented in sun exposed areas but normal in non-sun exposed areas. The history was obtained from the review of the chart and from the patient. Lab Results:    Most recent Alc is  Lab Results   Component Value Date    HGBA1C 9.4 (H) 10/30/2023               Lab Results   Component Value Date    CREATININE 1.24 10/30/2023    CREATININE 1.18 04/19/2023    CREATININE 1.37 (H) 10/18/2022    BUN 32 (H) 10/30/2023    K 5.0 10/30/2023    CL 91 (L) 10/30/2023    CO2 17 (L) 10/30/2023     eGFR   Date Value Ref Range Status   10/30/2023 66 >59 mL/min/1.73 Final         Lab Results   Component Value Date    HDL 32 (L) 04/19/2023    TRIG 137 04/19/2023    CHOLHDL 4.1 04/19/2023       Lab Results   Component Value Date    ALT 17 10/30/2023    AST 18 10/30/2023       Lab Results   Component Value Date    TSH 1.590 10/30/2023    FREET4 1.71 10/30/2023       Blood work performed on 10/30/2023 showed a hemoglobin A1c of 9.4%. CMP showed a glucose of 374 random, BUN 32, creatinine 1.24 with a GFR of 66, BUN/creatinine ratio of 26, sodium 129 with a chloride of 91 and a CO2 of 17 but was otherwise normal.     TSH is 1.59 with a free T4 of 1.71. CBC showed a white blood cell count of 10.9 but was otherwise normal.    Future Appointments   Date Time Provider [address]   4/30/2024 10:00 AM Flako Robledo MD [facility]       Transcribed for Flako Robledo MD, by Maikel Suero on 10/31/23 at 8:45 PM. Powered by [employer]. Pasted by [employer].

## 2023-10-31 NOTE — ASSESSMENT & PLAN NOTE
Most recent thyroid function studies are normal. He will continue the same levothyroxine 200 mcg 1 tablet 5 days a week, half tablet on Saturday and none on Sunday.

## 2023-10-31 NOTE — ASSESSMENT & PLAN NOTE
Hemoglobin A1c has gone up markedly from a 6.3 to 9.4%. This is likely due to. Having stopped his metformin dosage for unclear reasons just because so to speak. For now, I will restart his metformin 500 mg once daily for a week and then 1000 mg once daily at breakfast. He will continue the same Jardiance 10 mg daily. He will work on dietary changes and exercise. He will start checking his blood sugars more frequently up to once a day.  I have asked him to follow up with the ophthalmologist.

## 2023-10-31 NOTE — PATIENT INSTRUCTIONS
Hgba1c Is 9.4%. this demonstrates horrible control. Restart metformin 500 mg for 1 week in am and then 100 mg in am daily. Continue the same jardiance. For now, no change In florinef or hydrocortisone. Ok to eat some salty foods for now. Salt level is low. The thyroid is normal.Continue the same levothyroxine dosage. I want to recheck the sodium in 1-2 weeks. Follow up in 6 months with blood work.

## 2023-10-31 NOTE — ASSESSMENT & PLAN NOTE
He will continue the same atorvastatin 40 mg daily. He does follow with cardiology, and I will repeat a lipid profile with his next visit. I have asked him to follow up in 6 months with preceding hemoglobin A1c, CMP, TSH, free T4, lipid panel, and urine microalbumin to creatinine ratio. Noted. AF burden 4.4%. Patient scheduled for in office appointment to re-evaluate overall burden and symptoms. Continue to monitor at this time.

## 2023-12-06 LAB
ALBUMIN SERPL-MCNC: 4.7 G/DL (ref 3.9–4.9)
ALBUMIN/CREAT UR: <9 MG/G CREAT (ref 0–29)
ALBUMIN/GLOB SERPL: 1.6 {RATIO} (ref 1.2–2.2)
ALP SERPL-CCNC: 79 IU/L (ref 44–121)
ALT SERPL-CCNC: 16 IU/L (ref 0–44)
AST SERPL-CCNC: 17 IU/L (ref 0–40)
BILIRUB SERPL-MCNC: 0.7 MG/DL (ref 0–1.2)
BUN SERPL-MCNC: 29 MG/DL (ref 8–27)
BUN/CREAT SERPL: 22 (ref 10–24)
CALCIUM SERPL-MCNC: 9.3 MG/DL (ref 8.6–10.2)
CHLORIDE SERPL-SCNC: 95 MMOL/L (ref 96–106)
CHOLEST SERPL-MCNC: 201 MG/DL (ref 100–199)
CO2 SERPL-SCNC: 21 MMOL/L (ref 20–29)
CREAT SERPL-MCNC: 1.33 MG/DL (ref 0.76–1.27)
CREAT UR-MCNC: 34.6 MG/DL
EGFR: 60 ML/MIN/1.73
EST. AVERAGE GLUCOSE BLD GHB EST-MCNC: 217 MG/DL
GLOBULIN SER-MCNC: 3 G/DL (ref 1.5–4.5)
GLUCOSE SERPL-MCNC: 243 MG/DL (ref 70–99)
HBA1C MFR BLD: 9.2 % (ref 4.8–5.6)
HDLC SERPL-MCNC: 37 MG/DL
LDLC SERPL CALC-MCNC: 101 MG/DL (ref 0–99)
LDLC/HDLC SERPL: 2.7 RATIO (ref 0–3.6)
MICROALBUMIN UR-MCNC: <3 UG/ML
POTASSIUM SERPL-SCNC: 4.8 MMOL/L (ref 3.5–5.2)
PROT SERPL-MCNC: 7.7 G/DL (ref 6–8.5)
SL AMB VLDL CHOLESTEROL CALC: 63 MG/DL (ref 5–40)
SODIUM SERPL-SCNC: 136 MMOL/L (ref 134–144)
T4 FREE SERPL-MCNC: 1.88 NG/DL (ref 0.82–1.77)
TRIGL SERPL-MCNC: 371 MG/DL (ref 0–149)
TSH SERPL DL<=0.005 MIU/L-ACNC: 1.52 UIU/ML (ref 0.45–4.5)

## 2023-12-13 DIAGNOSIS — E11.65 TYPE 2 DIABETES MELLITUS WITH HYPERGLYCEMIA, WITHOUT LONG-TERM CURRENT USE OF INSULIN (HCC): ICD-10-CM

## 2024-01-14 DIAGNOSIS — E89.0 POSTOPERATIVE HYPOTHYROIDISM: ICD-10-CM

## 2024-01-15 RX ORDER — LEVOTHYROXINE SODIUM 0.2 MG/1
TABLET ORAL
Qty: 30 TABLET | Refills: 8 | Status: SHIPPED | OUTPATIENT
Start: 2024-01-15

## 2024-03-05 DIAGNOSIS — E89.0 POSTOPERATIVE HYPOTHYROIDISM: ICD-10-CM

## 2024-03-05 RX ORDER — LEVOTHYROXINE SODIUM 0.2 MG/1
TABLET ORAL
Qty: 72 TABLET | Refills: 4 | Status: SHIPPED | OUTPATIENT
Start: 2024-03-05

## 2024-03-24 DIAGNOSIS — E11.65 TYPE 2 DIABETES MELLITUS WITH HYPERGLYCEMIA, WITHOUT LONG-TERM CURRENT USE OF INSULIN (HCC): ICD-10-CM

## 2024-03-25 RX ORDER — EMPAGLIFLOZIN 10 MG/1
TABLET, FILM COATED ORAL
Qty: 30 TABLET | Refills: 5 | Status: SHIPPED | OUTPATIENT
Start: 2024-03-25

## 2024-04-27 ENCOUNTER — TELEPHONE (OUTPATIENT)
Dept: OTHER | Facility: OTHER | Age: 64
End: 2024-04-27

## 2024-04-30 ENCOUNTER — OFFICE VISIT (OUTPATIENT)
Dept: ENDOCRINOLOGY | Facility: HOSPITAL | Age: 64
End: 2024-04-30
Payer: COMMERCIAL

## 2024-04-30 VITALS
HEIGHT: 73 IN | BODY MASS INDEX: 27.83 KG/M2 | SYSTOLIC BLOOD PRESSURE: 120 MMHG | DIASTOLIC BLOOD PRESSURE: 70 MMHG | OXYGEN SATURATION: 96 % | WEIGHT: 210 LBS | HEART RATE: 83 BPM

## 2024-04-30 DIAGNOSIS — E78.2 MIXED HYPERLIPIDEMIA: ICD-10-CM

## 2024-04-30 DIAGNOSIS — E11.65 TYPE 2 DIABETES MELLITUS WITH HYPERGLYCEMIA, WITHOUT LONG-TERM CURRENT USE OF INSULIN (HCC): Primary | ICD-10-CM

## 2024-04-30 DIAGNOSIS — Z86.39 HISTORY OF MULTINODULAR GOITER: ICD-10-CM

## 2024-04-30 DIAGNOSIS — E27.40 ADRENAL INSUFFICIENCY (HCC): ICD-10-CM

## 2024-04-30 DIAGNOSIS — E89.6 H/O TOTAL ADRENALECTOMY (HCC): ICD-10-CM

## 2024-04-30 DIAGNOSIS — E89.0 POSTOPERATIVE HYPOTHYROIDISM: ICD-10-CM

## 2024-04-30 LAB
ALBUMIN SERPL-MCNC: 4.5 G/DL (ref 3.9–4.9)
ALBUMIN/CREAT UR: 14 MG/G CREAT (ref 0–29)
ALBUMIN/GLOB SERPL: 1.5 {RATIO} (ref 1.2–2.2)
ALP SERPL-CCNC: 76 IU/L (ref 44–121)
ALT SERPL-CCNC: 16 IU/L (ref 0–44)
AST SERPL-CCNC: 20 IU/L (ref 0–40)
BILIRUB SERPL-MCNC: 0.6 MG/DL (ref 0–1.2)
BUN SERPL-MCNC: 28 MG/DL (ref 8–27)
BUN/CREAT SERPL: 22 (ref 10–24)
CALCIUM SERPL-MCNC: 9.1 MG/DL (ref 8.6–10.2)
CHLORIDE SERPL-SCNC: 94 MMOL/L (ref 96–106)
CHOLEST SERPL-MCNC: 145 MG/DL (ref 100–199)
CO2 SERPL-SCNC: 26 MMOL/L (ref 20–29)
CREAT SERPL-MCNC: 1.29 MG/DL (ref 0.76–1.27)
CREAT UR-MCNC: 84.2 MG/DL
EGFR: 62 ML/MIN/1.73
EST. AVERAGE GLUCOSE BLD GHB EST-MCNC: 151 MG/DL
GLOBULIN SER-MCNC: 3 G/DL (ref 1.5–4.5)
GLUCOSE SERPL-MCNC: 182 MG/DL (ref 70–99)
HBA1C MFR BLD: 6.9 % (ref 4.8–5.6)
HDLC SERPL-MCNC: 29 MG/DL
LDLC SERPL CALC-MCNC: 88 MG/DL (ref 0–99)
LDLC/HDLC SERPL: 3 RATIO (ref 0–3.6)
MICROALBUMIN UR-MCNC: 11.5 UG/ML
POTASSIUM SERPL-SCNC: 4 MMOL/L (ref 3.5–5.2)
PROT SERPL-MCNC: 7.5 G/DL (ref 6–8.5)
SL AMB VLDL CHOLESTEROL CALC: 28 MG/DL (ref 5–40)
SODIUM SERPL-SCNC: 134 MMOL/L (ref 134–144)
T4 FREE SERPL-MCNC: 1.74 NG/DL (ref 0.82–1.77)
TRIGL SERPL-MCNC: 161 MG/DL (ref 0–149)
TSH SERPL DL<=0.005 MIU/L-ACNC: 1.37 UIU/ML (ref 0.45–4.5)

## 2024-04-30 PROCEDURE — 99214 OFFICE O/P EST MOD 30 MIN: CPT | Performed by: INTERNAL MEDICINE

## 2024-04-30 RX ORDER — ALBUTEROL SULFATE 90 UG/1
AEROSOL, METERED RESPIRATORY (INHALATION)
COMMUNITY
Start: 2024-03-18

## 2024-04-30 NOTE — PATIENT INSTRUCTIONS
We'll await the lab results.     Continue the same medicines for now.     Follow up in 6 months with blood work.

## 2024-04-30 NOTE — PROGRESS NOTES
Sarath Loaiza 63 y.o. male MRN: [id number]    Encounter: [id number]      Assessment/Plan     Assessment:  This is a 63 y.o.-year-old male with diabetes with hyperglycemia, without long-term insulin use.     Plan:  Type 2 DM with hyperglycemia without insulin use  We are waiting on lab results but at this time, he will continue on Metformin 1000 mg daily and Jardiance 10 mg daily. I encouraged him to check his blood sugars 2-3 times daily and continue to eat a healthy diet and exercise.     2. Adrenal Insuffiencey with h/o total adrenalectomy   Continue hydrocortisone 10 mg 3 tabs daily and fludrocortisone 0.1 mg daily.     3. Postoperative hypothyroidism   Continue levothyroxine  200 mcg 1 tablet 5 days a week, half tablet on Saturday and none on Sunday.     4. Hyperlipidemia  Continue the atorvastatin 40 mg daily.     We will contact the patient once we have the lab results back that he got drawn yesterday, 4/29/2024.     We will follow up in 6 months with CBC, CMP, Hbga1c, TSH, and T4 before his visit.        CC: Diabetes type 2, hypothyroidism, adrenal insuffiencey, lipid follow up    History of Present Illness     HPI:  Sarath Loaiza is a 63 y.o. year old male with type 2 diabetes hypothyroidism post thyroidectomy, adrenal insufficiency post bilateral adrenalectomy, and hyperlipidemia for follow-up visit. Diabetic complications include neuropathy and heart attack.     At his last appointment, he reportedly had stopped taking his metformin but has since resumed taking it. He is taking 1000mg daily in the morning. He is also taking Jardiance 10 mg 1 tab in the morning daily. He admits that he has not checked his blood sugars at all since his last visit. He denies numbness and tingling in his feet.     He reports that he drinks a lot of water and takes 160 mg Lasix which causes him to pee several times a day. He denies polyphagia, polyuria, numbness/tingling in his feet. He has an appointment with the eye doctor  tomorrow, 5/1/2024. He does not see a foot doctor.    Patient reports taking hydrocortisone 10 mg 3 tabs in the morning and Florinef 0.1 mg 1 tab daily for adrenal insuffiencey post adrenalectomy. He reports hyperpigmentation of his skin and occasional salt cravings.     The patient has hypothyroidism post thyroidectomy and takes levothyroxine 200 mcg 1 tablet 5 days a week, half tablet on Saturday and none on Sunday. He denies hot/cold intolerance, diarrhea, constipation, abdominal pain, nausea, blurry vision, double vision, tremors, SOB, chest pain, palpitations, trouble sleeping, fatigue, trouble swallowing, anxiety or depression.     The patient has hypercholesterolemia and his cardiologist increased his lasix to 160 mg a day after an episode of pleural effusion, bisoprolol 5 mg, 81 mg aspirin, atorvastatin 40 mg daily and the nitroglycerin prn. He denies chest pain and SOB.     Patient had labs drawn at [facility] on 4/29/2024 and we are awaiting results. Last foot exam was in 04/2023.      Review of Systems   Constitutional:  Negative for fatigue and unexpected weight change.   HENT:  Negative for trouble swallowing.    Eyes:  Negative for visual disturbance.   Respiratory:  Negative for shortness of breath.    Cardiovascular:  Negative for chest pain and palpitations.   Gastrointestinal:  Negative for abdominal pain, constipation, diarrhea and nausea.   Endocrine: Positive for polyuria. Negative for cold intolerance, heat intolerance, polydipsia and polyphagia.        Takes lasix and drinks a lot of water- will go several times a day. Occasional salt cravings.    Skin:         Hyperpigmentation of skin. Mild dry skin. No brittle nails.    Neurological:  Negative for tremors and numbness.   Psychiatric/Behavioral:  Negative for dysphoric mood and sleep disturbance. The patient is not nervous/anxious.         Uses Zquil at night and reports a good nights sleep.        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    Coronary artery disease     Heart attack (HCC)     twice    Hodgkin lymphoma (HCC)     at age 27, had chemo and XRT    Hyperlipidemia      Past Surgical History:   Procedure Laterality Date    ADRENALECTOMY Bilateral     CARDIAC DEFIBRILLATOR PLACEMENT      and pacemaker    CORONARY ANGIOPLASTY WITH STENT PLACEMENT      2 stents    CORONARY ARTERY BYPASS GRAFT      X 2    LUNG BIOPSY      NEPHRECTOMY Right     TOTAL THYROIDECTOMY       Social History   Social History     Substance and Sexual Activity   Alcohol Use Yes     Social History     Substance and Sexual Activity   Drug Use Never     Social History     Tobacco Use   Smoking Status Former    Current packs/day: 0.00    Types: Cigarettes    Start date:     Quit date:     Years since quittin.3   Smokeless Tobacco Never     Family History:   Family History   Problem Relation Age of Onset    Heart disease Mother         has a pacemaker    Cancer Father     Diabetes type II Father     Heart disease Sister     Diabetes type II Brother     No Known Problems Brother     No Known Problems Son        Meds/Allergies   Current Outpatient Medications   Medication Sig Dispense Refill    albuterol (PROVENTIL HFA,VENTOLIN HFA) 90 mcg/act inhaler As needed      Aspirin Buf,CaCarb-MgCarb-MgO, 81 MG TABS Take 81 mg by mouth daily      atorvastatin (LIPITOR) 40 mg tablet Take 40 mg by mouth daily      bisoprolol (ZEBETA) 5 mg tablet Take 2.5 mg by mouth daily      fludrocortisone (FLORINEF) 0.1 mg tablet TAKE 1 TABLET BY MOUTH EVERY DAY 90 tablet 4    furosemide (LASIX) 40 mg tablet Take 80 mg by mouth 2 (two) times a day      hydrocortisone (CORTEF) 10 mg tablet TAKE 3 TABLETS BY MOUTH DAILY (DOUBLE IF ILL) 90 tablet 13    hydrocortisone sodium succinate, PF, (Solu-CORTEF) 100 mg SOLR Inject 100mg (2mL) IM in case of severe vomiting illness or adrenal  "crisis.  Dispense ACT-O-VIAL ONLY      Jardiance 10 MG TABS tablet TAKE 1 TABLET BY MOUTH EVERY DAY IN THE MORNING 30 tablet 5    levothyroxine 200 mcg tablet TAKE 1 TABLET BY MOUTH DAILY 5 DAYS OF THE WEEK. TAKE 1/2 TABLET ON SAT AND NONE ON SUN 72 tablet 4    metFORMIN (GLUCOPHAGE) 1000 MG tablet Take 1 tablet (1,000 mg total) by mouth daily with breakfast 90 tablet 1    nitroglycerin (NITROSTAT) 0.4 mg SL tablet Place 0.4 mg under the tongue      sildenafil (VIAGRA) 50 MG tablet Take 100 mg by mouth as needed      Continuous Blood Gluc Sensor ([employer] Krishna 14 Day Sensor) [facility] Use to test blood sugars 3-4 times a day. Apply every 14 days. (Patient not taking: Reported on 4/12/2022) 2 each 6     No current facility-administered medications for this visit.     Allergies   Allergen Reactions    Other Swelling     bee       Objective   Vitals: BP: 120/70 mmhg, 83 bpm, Sp02: 96%, 210 lbs. 6'1, BMI: 27.71 kg/m2    Physical Exam  Vitals reviewed.   Constitutional:       Appearance: Normal appearance.   HENT:      Head: Normocephalic and atraumatic.   Eyes:      Extraocular Movements: Extraocular movements intact.      Comments: No lid lag, periorbital edema, proptosis.    Neck:      Comments: Post-thyroidectomy- no tissue felt.   Cardiovascular:      Rate and Rhythm: Normal rate and regular rhythm.      Heart sounds: Normal heart sounds.   Pulmonary:      Breath sounds: Normal breath sounds.   Musculoskeletal:      Comments: No tremor of outstretched hand   Skin:     General: Skin is warm and dry.   Neurological:      Mental Status: He is alert.      Comments: Patellar DTRs 2+         The history was obtained from the review of the chart, patient.      Imaging Studies: I have personally reviewed pertinent reports.      Portions of the record may have been created with voice recognition software. Occasional wrong word or \"sound a like\" substitutions may have occurred due to the inherent limitations of voice recognition " software. Read the chart carefully and recognize, using context, where substitutions have occurred.

## 2024-05-01 ENCOUNTER — TELEPHONE (OUTPATIENT)
Dept: ENDOCRINOLOGY | Facility: HOSPITAL | Age: 64
End: 2024-05-01

## 2024-05-01 NOTE — TELEPHONE ENCOUNTER
----- Message from Iliana Bernabe MD sent at 5/1/2024 12:40 PM EDT -----  Call patient.  I received his blood work and his hemoglobin A1c is down to 6.9%.  This is excellent.  His liver and kidney function are stable his salt water balance is stable his thyroid is normal he has no evidence of diabetes effects on the kidneys and his cholesterol is good.  For now no change in medicines and I will see him in another 6 months.  
I was able to call the patient and make him aware of his most recent test results and recommendations from Dr. Bernabe.  The patient is aware and gave verbal understanding.  
None

## 2024-05-02 DIAGNOSIS — E27.40 ADRENAL INSUFFICIENCY (HCC): ICD-10-CM

## 2024-05-02 DIAGNOSIS — E89.6 H/O TOTAL ADRENALECTOMY (HCC): ICD-10-CM

## 2024-05-02 RX ORDER — HYDROCORTISONE 10 MG/1
TABLET ORAL
Qty: 100 TABLET | Refills: 5 | Status: SHIPPED | OUTPATIENT
Start: 2024-05-02

## 2024-05-26 DIAGNOSIS — E89.6 H/O TOTAL ADRENALECTOMY (HCC): ICD-10-CM

## 2024-05-26 DIAGNOSIS — E27.40 ADRENAL INSUFFICIENCY (HCC): ICD-10-CM

## 2024-05-28 ENCOUNTER — TELEPHONE (OUTPATIENT)
Age: 64
End: 2024-05-28

## 2024-05-28 RX ORDER — HYDROCORTISONE 10 MG/1
TABLET ORAL
Qty: 90 TABLET | Refills: 13 | Status: SHIPPED | OUTPATIENT
Start: 2024-05-28

## 2024-05-28 NOTE — TELEPHONE ENCOUNTER
Yes, he should get 100 mg hydrocortisone IV as the procedure is starting.  Who is doing his scope?

## 2024-05-28 NOTE — TELEPHONE ENCOUNTER
Patient called- he is getting a scope down his throat on Friday. He said because he has no adrenals, Dr. Bernabe will usually have them give him an extra 100 mg of hydrocortisone for procedures. Does she want them to do that again? Please advise.

## 2024-05-28 NOTE — TELEPHONE ENCOUNTER
Requested medication(s) are due for refill today: Yes  Patient has already received a courtesy refill: No  Other reason request has been forwarded to provider: failed protcolo

## 2024-05-30 NOTE — TELEPHONE ENCOUNTER
Patient states gastroenterologist is with [facility]-  [phone] .Patient states he can not find the name of the doctor.

## 2024-05-30 NOTE — TELEPHONE ENCOUNTER
Patient returning call. Relayed above message. He will call [facility] to get the Dr who is doing scope info. He does not remember the Dr. Name. He will call back.

## 2024-05-31 ENCOUNTER — HOSPITAL ENCOUNTER (OUTPATIENT)
Dept: HOSPITAL 99 - SDS | Age: 64
Discharge: HOME | End: 2024-05-31
Payer: COMMERCIAL

## 2024-05-31 VITALS — DIASTOLIC BLOOD PRESSURE: 63 MMHG | RESPIRATION RATE: 16 BRPM | SYSTOLIC BLOOD PRESSURE: 106 MMHG

## 2024-05-31 VITALS — SYSTOLIC BLOOD PRESSURE: 112 MMHG | RESPIRATION RATE: 15 BRPM | DIASTOLIC BLOOD PRESSURE: 73 MMHG

## 2024-05-31 VITALS — SYSTOLIC BLOOD PRESSURE: 108 MMHG | RESPIRATION RATE: 15 BRPM | DIASTOLIC BLOOD PRESSURE: 62 MMHG

## 2024-05-31 VITALS
OXYGEN SATURATION: 2 % | SYSTOLIC BLOOD PRESSURE: 110 MMHG | DIASTOLIC BLOOD PRESSURE: 70 MMHG | RESPIRATION RATE: 20 BRPM

## 2024-05-31 VITALS — RESPIRATION RATE: 16 BRPM | DIASTOLIC BLOOD PRESSURE: 64 MMHG | SYSTOLIC BLOOD PRESSURE: 105 MMHG

## 2024-05-31 VITALS — SYSTOLIC BLOOD PRESSURE: 95 MMHG | DIASTOLIC BLOOD PRESSURE: 68 MMHG

## 2024-05-31 VITALS — SYSTOLIC BLOOD PRESSURE: 106 MMHG | DIASTOLIC BLOOD PRESSURE: 62 MMHG

## 2024-05-31 VITALS — SYSTOLIC BLOOD PRESSURE: 110 MMHG | DIASTOLIC BLOOD PRESSURE: 64 MMHG | RESPIRATION RATE: 15 BRPM

## 2024-05-31 VITALS — DIASTOLIC BLOOD PRESSURE: 93 MMHG | SYSTOLIC BLOOD PRESSURE: 105 MMHG | RESPIRATION RATE: 17 BRPM

## 2024-05-31 VITALS — OXYGEN SATURATION: 2 %

## 2024-05-31 VITALS — SYSTOLIC BLOOD PRESSURE: 117 MMHG | DIASTOLIC BLOOD PRESSURE: 69 MMHG | RESPIRATION RATE: 18 BRPM

## 2024-05-31 VITALS — RESPIRATION RATE: 16 BRPM | DIASTOLIC BLOOD PRESSURE: 63 MMHG

## 2024-05-31 VITALS — SYSTOLIC BLOOD PRESSURE: 113 MMHG | RESPIRATION RATE: 16 BRPM | DIASTOLIC BLOOD PRESSURE: 63 MMHG

## 2024-05-31 VITALS — DIASTOLIC BLOOD PRESSURE: 70 MMHG | RESPIRATION RATE: 16 BRPM | SYSTOLIC BLOOD PRESSURE: 107 MMHG

## 2024-05-31 VITALS — BODY MASS INDEX: 29.7 KG/M2

## 2024-05-31 DIAGNOSIS — K86.9: Primary | ICD-10-CM

## 2024-05-31 DIAGNOSIS — Z85.71: ICD-10-CM

## 2024-05-31 DIAGNOSIS — R93.3: ICD-10-CM

## 2024-05-31 DIAGNOSIS — K22.2: ICD-10-CM

## 2024-05-31 DIAGNOSIS — K22.89: ICD-10-CM

## 2024-05-31 LAB
GLUCOSE - POINT OF CARE: 135 MG/DL (ref 70–99)
GLUCOSE - POINT OF CARE: 163 MG/DL (ref 70–99)
GLUCOSE - POINT OF CARE: 184 MG/DL (ref 70–99)

## 2024-05-31 PROCEDURE — 88172 CYTP DX EVAL FNA 1ST EA SITE: CPT

## 2024-05-31 PROCEDURE — 88173 CYTOPATH EVAL FNA REPORT: CPT

## 2024-05-31 PROCEDURE — 88305 TISSUE EXAM BY PATHOLOGIST: CPT

## 2024-05-31 PROCEDURE — 43242 EGD US FINE NEEDLE BX/ASPIR: CPT

## 2024-06-07 DIAGNOSIS — E27.40 ADRENAL INSUFFICIENCY (HCC): Primary | ICD-10-CM

## 2024-06-07 DIAGNOSIS — E11.65 TYPE 2 DIABETES MELLITUS WITH HYPERGLYCEMIA, WITHOUT LONG-TERM CURRENT USE OF INSULIN (HCC): ICD-10-CM

## 2024-06-07 RX ORDER — HYDROCORTISONE SODIUM SUCCINATE 100 MG/2ML
INJECTION, POWDER, FOR SOLUTION INTRAMUSCULAR; INTRAVENOUS
Qty: 10 ML | Refills: 0 | Status: SHIPPED | OUTPATIENT
Start: 2024-06-07

## 2024-06-07 NOTE — TELEPHONE ENCOUNTER
Patient called in stating that he is having a colonoscopy this upcoming Monday 6/10 at 12pm and he needs us to call the facility to have a 100mg Hydrocortisone IM prior to procedure.      [place], [place]    PHONE: [phone]    PLEASE ADVISE.

## 2024-06-27 ENCOUNTER — TELEPHONE (OUTPATIENT)
Age: 64
End: 2024-06-27

## 2024-06-27 DIAGNOSIS — E27.40 ADRENAL INSUFFICIENCY (HCC): ICD-10-CM

## 2024-06-27 NOTE — TELEPHONE ENCOUNTER
I was able to get the number to [facility] for the fax to go over and it is [phone].  The patient also wanted me to make you aware that he was recently diagnosed with kidney cancer and he wanted to know if this would change his hydrocortisone.

## 2024-06-27 NOTE — TELEPHONE ENCOUNTER
I printed a prescription for hydrocortisone IV on call for the procedure.  We need to fax it to somewhere at [place] I presume that will needed for his procedure tomorrow.

## 2024-06-27 NOTE — TELEPHONE ENCOUNTER
No, having the kidney cancer will not change the hydrocortisone dosage.  Even if he has the kidney removed, he is already on replacement hydrocortisone so it will not make a difference.  He would just need stress dose steroids for any surgery.

## 2024-06-27 NOTE — TELEPHONE ENCOUNTER
Patient called in said that he is having a procedure tomorrow getting a port put in with Dr Kat at [place].  He asks that Dr Bernabe send the order for the Hydrocortisone for before the procedure.  Please advise and call patient when completed.

## 2024-06-28 NOTE — TELEPHONE ENCOUNTER
I was able to call the patient back and make him aware of the answer to the question posed to the provider about his hydrocortisone.  He gave verbal understanding and I did make him aware that we faxed over the script for the medication that was requested as well.

## 2024-06-29 DIAGNOSIS — E11.65 TYPE 2 DIABETES MELLITUS WITH HYPERGLYCEMIA, WITHOUT LONG-TERM CURRENT USE OF INSULIN (HCC): ICD-10-CM

## 2024-08-05 DIAGNOSIS — E27.40 ADRENAL INSUFFICIENCY (HCC): ICD-10-CM

## 2024-08-05 RX ORDER — FLUDROCORTISONE ACETATE 0.1 MG/1
TABLET ORAL
Qty: 30 TABLET | Refills: 14 | Status: SHIPPED | OUTPATIENT
Start: 2024-08-05

## 2024-09-17 ENCOUNTER — HOSPITAL ENCOUNTER (OUTPATIENT)
Dept: HOSPITAL 99 - SDS | Age: 64
Discharge: HOME | End: 2024-09-17
Payer: COMMERCIAL

## 2024-09-17 VITALS — BODY MASS INDEX: 26 KG/M2

## 2024-09-17 VITALS — RESPIRATION RATE: 11 BRPM | DIASTOLIC BLOOD PRESSURE: 63 MMHG

## 2024-09-17 VITALS — DIASTOLIC BLOOD PRESSURE: 73 MMHG | SYSTOLIC BLOOD PRESSURE: 143 MMHG

## 2024-09-17 VITALS — RESPIRATION RATE: 18 BRPM | DIASTOLIC BLOOD PRESSURE: 64 MMHG | SYSTOLIC BLOOD PRESSURE: 113 MMHG

## 2024-09-17 VITALS — SYSTOLIC BLOOD PRESSURE: 116 MMHG | DIASTOLIC BLOOD PRESSURE: 58 MMHG

## 2024-09-17 DIAGNOSIS — C15.4: Primary | ICD-10-CM

## 2024-09-17 DIAGNOSIS — R13.13: ICD-10-CM

## 2024-09-17 DIAGNOSIS — K22.89: ICD-10-CM

## 2024-09-17 DIAGNOSIS — Z79.01: ICD-10-CM

## 2024-09-17 DIAGNOSIS — K22.2: ICD-10-CM

## 2024-09-17 DIAGNOSIS — K22.10: ICD-10-CM

## 2024-09-17 LAB
GLUCOSE - POINT OF CARE: 117 MG/DL (ref 70–99)
GLUCOSE - POINT OF CARE: 147 MG/DL (ref 70–99)

## 2024-09-17 PROCEDURE — C1726 CATH, BAL DIL, NON-VASCULAR: HCPCS

## 2024-09-17 PROCEDURE — 88305 TISSUE EXAM BY PATHOLOGIST: CPT

## 2024-09-17 PROCEDURE — 43249 ESOPH EGD DILATION <30 MM: CPT

## 2024-09-17 PROCEDURE — 43237 ENDOSCOPIC US EXAM ESOPH: CPT

## 2024-09-17 PROCEDURE — 43239 EGD BIOPSY SINGLE/MULTIPLE: CPT

## 2024-09-17 RX ADMIN — HYDROCORTISONE SODIUM SUCCINATE 100 MG: 100 INJECTION, POWDER, FOR SOLUTION INTRAMUSCULAR; INTRAVENOUS at 12:20

## 2024-09-20 ENCOUNTER — TELEPHONE (OUTPATIENT)
Age: 64
End: 2024-09-20

## 2024-09-20 DIAGNOSIS — E11.65 TYPE 2 DIABETES MELLITUS WITH HYPERGLYCEMIA, WITHOUT LONG-TERM CURRENT USE OF INSULIN (HCC): Primary | ICD-10-CM

## 2024-09-20 DIAGNOSIS — R13.10 SWALLOWING DYSFUNCTION: ICD-10-CM

## 2024-09-20 DIAGNOSIS — C15.9 MALIGNANT NEOPLASM OF ESOPHAGUS, UNSPECIFIED LOCATION (HCC): ICD-10-CM

## 2024-09-20 RX ORDER — METFORMIN HYDROCHLORIDE 500 MG/5ML
1000 SOLUTION ORAL
Qty: 300 ML | Refills: 3 | Status: SHIPPED | OUTPATIENT
Start: 2024-09-20

## 2024-09-20 NOTE — TELEPHONE ENCOUNTER
Patient called- he just found out that he has cancer in his throat near his esophagus. He has a hard time swallowing pills. He will be starting a feeding tube and chemo/radiation soon.    He was able to swallow 1/2 of his Metformin this morning but the other half came back up. He is going to need a liquid form in order to get the full dose, please advise.    He said the levothyroxine is fine because it is small enough.

## 2024-09-20 NOTE — TELEPHONE ENCOUNTER
I have ordered a liquid metformin 1000 mg at breakfast so that he can utilize that either to swallow by himself or he could instill it in his feeding tube.

## 2024-09-23 ENCOUNTER — TELEPHONE (OUTPATIENT)
Age: 64
End: 2024-09-23

## 2024-09-23 NOTE — TELEPHONE ENCOUNTER
Patient aware. He confirmed that he is able to swallow the smaller pills for now. He will let you know if anything changes. He also has a follow up with you on 10/7.

## 2024-09-25 NOTE — TELEPHONE ENCOUNTER
PA for Metformin HCl (RIOMET) 500 MG/5ML oral solution SUBMITTED     via    []CMM-KEY:   [x]Surescripts-Case ID #     [id number]     []Faxed to plan   []Other website   []Phone call Case ID #     Office notes sent, clinical questions answered. Awaiting determination    Turnaround time for your insurance to make a decision on your Prior Authorization can take 7-21 business days.

## 2024-09-26 NOTE — TELEPHONE ENCOUNTER
Patient called to check to see if PA was started on his medication, advised we are waiting on a determination from his insurance company. Patient verbalized understanding and was in agreement. Advised 7-21 day turnaround time.

## 2024-10-04 LAB
ALBUMIN SERPL-MCNC: 4.2 G/DL (ref 3.9–4.9)
ALP SERPL-CCNC: 60 IU/L (ref 44–121)
ALT SERPL-CCNC: 11 IU/L (ref 0–44)
AST SERPL-CCNC: 16 IU/L (ref 0–40)
BASOPHILS # BLD AUTO: 0.1 X10E3/UL (ref 0–0.2)
BASOPHILS NFR BLD AUTO: 1 %
BILIRUB SERPL-MCNC: 0.9 MG/DL (ref 0–1.2)
BUN SERPL-MCNC: 24 MG/DL (ref 8–27)
BUN/CREAT SERPL: 20 (ref 10–24)
CALCIUM SERPL-MCNC: 9.1 MG/DL (ref 8.6–10.2)
CHLORIDE SERPL-SCNC: 96 MMOL/L (ref 96–106)
CO2 SERPL-SCNC: 25 MMOL/L (ref 20–29)
CREAT SERPL-MCNC: 1.18 MG/DL (ref 0.76–1.27)
EGFR: 69 ML/MIN/1.73
EOSINOPHIL # BLD AUTO: 0.3 X10E3/UL (ref 0–0.4)
EOSINOPHIL NFR BLD AUTO: 6 %
ERYTHROCYTE [DISTWIDTH] IN BLOOD BY AUTOMATED COUNT: 14.1 % (ref 11.6–15.4)
EST. AVERAGE GLUCOSE BLD GHB EST-MCNC: 126 MG/DL
GLOBULIN SER-MCNC: 3 G/DL (ref 1.5–4.5)
GLUCOSE SERPL-MCNC: 131 MG/DL (ref 70–99)
HBA1C MFR BLD: 6 % (ref 4.8–5.6)
HCT VFR BLD AUTO: 42.2 % (ref 37.5–51)
HGB BLD-MCNC: 13.7 G/DL (ref 13–17.7)
IMM GRANULOCYTES # BLD: 0 X10E3/UL (ref 0–0.1)
IMM GRANULOCYTES NFR BLD: 0 %
LYMPHOCYTES # BLD AUTO: 1.1 X10E3/UL (ref 0.7–3.1)
LYMPHOCYTES NFR BLD AUTO: 26 %
MCH RBC QN AUTO: 30.2 PG (ref 26.6–33)
MCHC RBC AUTO-ENTMCNC: 32.5 G/DL (ref 31.5–35.7)
MCV RBC AUTO: 93 FL (ref 79–97)
MONOCYTES # BLD AUTO: 0.4 X10E3/UL (ref 0.1–0.9)
MONOCYTES NFR BLD AUTO: 9 %
NEUTROPHILS # BLD AUTO: 2.6 X10E3/UL (ref 1.4–7)
NEUTROPHILS NFR BLD AUTO: 58 %
PLATELET # BLD AUTO: 225 X10E3/UL (ref 150–450)
POTASSIUM SERPL-SCNC: 3.7 MMOL/L (ref 3.5–5.2)
PROT SERPL-MCNC: 7.2 G/DL (ref 6–8.5)
RBC # BLD AUTO: 4.54 X10E6/UL (ref 4.14–5.8)
SODIUM SERPL-SCNC: 140 MMOL/L (ref 134–144)
T4 FREE SERPL-MCNC: 1.99 NG/DL (ref 0.82–1.77)
TSH SERPL DL<=0.005 MIU/L-ACNC: 0.52 UIU/ML (ref 0.45–4.5)
WBC # BLD AUTO: 4.4 X10E3/UL (ref 3.4–10.8)

## 2024-10-05 DIAGNOSIS — E11.65 TYPE 2 DIABETES MELLITUS WITH HYPERGLYCEMIA, WITHOUT LONG-TERM CURRENT USE OF INSULIN (HCC): ICD-10-CM

## 2024-10-07 ENCOUNTER — OFFICE VISIT (OUTPATIENT)
Dept: ENDOCRINOLOGY | Facility: HOSPITAL | Age: 64
End: 2024-10-07
Payer: COMMERCIAL

## 2024-10-07 VITALS
WEIGHT: 190.4 LBS | HEIGHT: 73 IN | HEART RATE: 91 BPM | SYSTOLIC BLOOD PRESSURE: 118 MMHG | DIASTOLIC BLOOD PRESSURE: 78 MMHG | BODY MASS INDEX: 25.23 KG/M2

## 2024-10-07 DIAGNOSIS — E89.0 POSTOPERATIVE HYPOTHYROIDISM: ICD-10-CM

## 2024-10-07 DIAGNOSIS — E89.6 H/O TOTAL ADRENALECTOMY (HCC): ICD-10-CM

## 2024-10-07 DIAGNOSIS — E78.2 MIXED HYPERLIPIDEMIA: ICD-10-CM

## 2024-10-07 DIAGNOSIS — E11.65 TYPE 2 DIABETES MELLITUS WITH HYPERGLYCEMIA, WITHOUT LONG-TERM CURRENT USE OF INSULIN (HCC): Primary | ICD-10-CM

## 2024-10-07 DIAGNOSIS — E27.40 ADRENAL INSUFFICIENCY (HCC): ICD-10-CM

## 2024-10-07 PROCEDURE — 99214 OFFICE O/P EST MOD 30 MIN: CPT | Performed by: INTERNAL MEDICINE

## 2024-10-07 RX ORDER — POTASSIUM CHLORIDE 1.5 G/1.58G
POWDER, FOR SOLUTION ORAL
COMMUNITY
Start: 2024-09-24

## 2024-10-07 RX ORDER — EMPAGLIFLOZIN 10 MG/1
TABLET, FILM COATED ORAL
Qty: 30 TABLET | Refills: 5 | Status: SHIPPED | OUTPATIENT
Start: 2024-10-07

## 2024-10-07 NOTE — PATIENT INSTRUCTIONS
Hgba1c is 6.0%. this is excellent.     For now, continue the same jardiance 10 mg daily. Hold the metformin while we watch sugars and see if liquid eventually gets approved.     Check blood sugars periodically.     Continue the same levothyroxine, hydrocortisone, and fludrocortisone. Remember to double hydrocortisone for procedures.     Follow up in 6 months with blood work.

## 2024-10-07 NOTE — PROGRESS NOTES
10/7/2024    Assessment & Plan      Diagnoses and all orders for this visit:    Type 2 diabetes mellitus with hyperglycemia, without long-term current use of insulin (HCC)  -     Comprehensive metabolic panel; Future  -     T4, free; Future  -     TSH, 3rd generation; Future  -     Hemoglobin A1C; Future  -     Lipid Panel with Direct LDL reflex; Future  -     Albumin / creatinine urine ratio; Future  -     Comprehensive metabolic panel  -     T4, free  -     TSH, 3rd generation  -     Hemoglobin A1C  -     Lipid Panel with Direct LDL reflex  -     Albumin / creatinine urine ratio    Postoperative hypothyroidism  -     Comprehensive metabolic panel; Future  -     T4, free; Future  -     TSH, 3rd generation; Future  -     Hemoglobin A1C; Future  -     Lipid Panel with Direct LDL reflex; Future  -     Albumin / creatinine urine ratio; Future  -     Comprehensive metabolic panel  -     T4, free  -     TSH, 3rd generation  -     Hemoglobin A1C  -     Lipid Panel with Direct LDL reflex  -     Albumin / creatinine urine ratio    Adrenal insufficiency (HCC)  -     Comprehensive metabolic panel; Future  -     T4, free; Future  -     TSH, 3rd generation; Future  -     Hemoglobin A1C; Future  -     Lipid Panel with Direct LDL reflex; Future  -     Albumin / creatinine urine ratio; Future  -     Comprehensive metabolic panel  -     T4, free  -     TSH, 3rd generation  -     Hemoglobin A1C  -     Lipid Panel with Direct LDL reflex  -     Albumin / creatinine urine ratio    H/O total adrenalectomy (HCC)  -     Comprehensive metabolic panel; Future  -     T4, free; Future  -     TSH, 3rd generation; Future  -     Hemoglobin A1C; Future  -     Lipid Panel with Direct LDL reflex; Future  -     Albumin / creatinine urine ratio; Future  -     Comprehensive metabolic panel  -     T4, free  -     TSH, 3rd generation  -     Hemoglobin A1C  -     Lipid Panel with Direct LDL reflex  -     Albumin / creatinine urine ratio    Mixed  hyperlipidemia  -     Comprehensive metabolic panel; Future  -     T4, free; Future  -     TSH, 3rd generation; Future  -     Hemoglobin A1C; Future  -     Lipid Panel with Direct LDL reflex; Future  -     Albumin / creatinine urine ratio; Future  -     Comprehensive metabolic panel  -     T4, free  -     TSH, 3rd generation  -     Hemoglobin A1C  -     Lipid Panel with Direct LDL reflex  -     Albumin / creatinine urine ratio    Other orders  -     potassium chloride (KLOR-CON) 20 mEq packet; TAKE 2 PACKETS BY MOUTH DAILY          Assessment & Plan  1. Type 2 diabetes.  Most recent hemoglobin A1c is 6%, which demonstrates excellent control of his diabetes. However, this is likely due to his new diagnosis of esophageal cancer, which has caused an inability to eat much due to swallowing issues and weight loss from chemotherapy. He is currently using only Jardiance 10 mg daily as he is unable to swallow metformin, and the metformin liquid prior authorization is still pending. He will continue the same Jardiance until he starts eating more effectively, at which point metformin may be restarted.    2. Hypothyroidism.  Most recent thyroid function studies are normal. He will continue the same levothyroxine dosage of 200 mcg 5 days a week, half tablet on Saturday, and none on Sunday.    3. Adrenal insufficiency post bilateral adrenalectomy.  He is hemodynamically stable and has been able to continue taking both hydrocortisone and Florinef. Sodium and potassium levels are normal with no lower extremity edema. He will continue these dosages the same. The use of extra hydrocortisone if he is ill was reviewed, which he takes intermittently.    4. Hyperlipidemia.  A lipid profile will be repeated at his next visit. For now, he will continue the same atorvastatin 40 mg daily.    Follow-up  Return in 6 months with preceding hemoglobin A1c, CMP, lipid panel, TSH, free T4, and urine microalbumin to creatinine ratio.        CC:  Diabetes type II, thyroid, adrenal follow-up       History of Present Illness    HPI: Sarath Loaiza is a 63-year-old male with a history of type 2 diabetes diagnosed 7 to 10 years ago, hypothyroidism post-thyroidectomy, adrenal insufficiency post-bilateral adrenalectomy, and hyperlipidemia, here for a follow-up visit.    He has diabetes complications including neuropathy and a history of a heart attack. He reports no history of nephropathy, retinopathy, stroke, or claudication.     He is currently on Jardiance 10 mg and takes his pills in the morning with yogurt and water. He urinates twice a day due to Lasix but does not wake up at night to urinate. He does not feel thirsty or hungry and has lost weight from 190 pounds.  He denies numbness or tingling in his feet, blurry vision, or foot wounds. His last eye doctor visit was at the beginning of the year. He sleeps well, sometimes using ZzzQuil, and does not feel tired during the day.    He has been experiencing difficulty swallowing, which led to an endoscopy and biopsy revealing esophageal cancer. He has a history of radiation therapy for Hodgkin's disease at age 27. His current treatment options include proton therapy or surgery, with a preference for surgery. He has requested liquid metformin due to his swallowing issues, but the insurance company has denied it as he already has metformin. He can consume certain foods like scrambled eggs, creamy soups, and brothy soups, but fiber-rich foods cause discomfort. He sometimes vomits in the evening and massages his epigastric area to relieve the sensation of food getting stuck in his esophagus. He had severe diarrhea from cancer medication, which caused rapid weight loss, and has been consuming protein drinks to counteract this. He reports no lightheadedness or dizziness upon standing, and his skin color remains the same. He denies tremors or shaky hands.    He experienced heart racing due to his pacemaker and was put on  blood pressure medication, but his blood pressure dropped too low, so he stopped taking it. He does not feel tired during the day and reports no issues with lightheadedness or dizziness upon standing.    A while ago, spots were found on his kidney and lung. He started treatment for 2-1/2 months, including chemotherapy.    Blood Sugar/Glucometer/Pump/CGM review: He has not been checking his blood sugars and does not experience symptoms of low blood sugar such as feeling shaky or sweaty.       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    Coronary artery disease     Heart attack (HCC)     twice    Hodgkin lymphoma (HCC)     at age 27, had chemo and XRT    Hyperlipidemia      Past Surgical History:   Procedure Laterality Date    ADRENALECTOMY Bilateral     CARDIAC DEFIBRILLATOR PLACEMENT      and pacemaker    CORONARY ANGIOPLASTY WITH STENT PLACEMENT      2 stents    CORONARY ARTERY BYPASS GRAFT      X 2    LUNG BIOPSY      NEPHRECTOMY Right     TOTAL THYROIDECTOMY       Social History   Social History     Substance and Sexual Activity   Alcohol Use Yes     Social History     Substance and Sexual Activity   Drug Use Never     Social History     Tobacco Use   Smoking Status Former    Current packs/day: 0.00    Types: Cigarettes    Start date:     Quit date:     Years since quittin.7   Smokeless Tobacco Never     Family History:   Family History   Problem Relation Age of Onset    Heart disease Mother         has a pacemaker    Cancer Father     Diabetes type II Father     Heart disease Sister     Diabetes type II Brother     No Known Problems Brother     No Known Problems Son        Meds/Allergies   Current Outpatient Medications   Medication Sig Dispense Refill    albuterol (PROVENTIL HFA,VENTOLIN HFA) 90 mcg/act inhaler As needed      Aspirin Buf,CaCarb-MgCarb-MgO, 81 MG TABS Take 81 mg by mouth  "daily      atorvastatin (LIPITOR) 40 mg tablet Take 40 mg by mouth daily      bisoprolol (ZEBETA) 5 mg tablet Take 2.5 mg by mouth daily      fludrocortisone (FLORINEF) 0.1 mg tablet TAKE 1 TABLET BY MOUTH EVERY DAY 30 tablet 14    furosemide (LASIX) 40 mg tablet Take 80 mg by mouth 2 (two) times a day      hydrocortisone (CORTEF) 10 mg tablet TAKE 3 TABLETS BY MOUTH DAILY (DOUBLE IF ILL) 90 tablet 13    hydrocortisone (Solu-CORTEF) 100 mg SOLR 100 mg IV hydrocortisone on call for  procedure 10 mL 0    Jardiance 10 MG TABS tablet TAKE 1 TABLET BY MOUTH EVERY DAY IN THE MORNING 30 tablet 5    levothyroxine 200 mcg tablet TAKE 1 TABLET BY MOUTH DAILY 5 DAYS OF THE WEEK. TAKE 1/2 TABLET ON SAT AND NONE ON SUN 72 tablet 4    nitroglycerin (NITROSTAT) 0.4 mg SL tablet Place 0.4 mg under the tongue      potassium chloride (KLOR-CON) 20 mEq packet TAKE 2 PACKETS BY MOUTH DAILY      sildenafil (VIAGRA) 50 MG tablet Take 100 mg by mouth as needed      Continuous Blood Gluc Sensor (FreeStyle Krishna 14 Day Sensor) [facility] [place] Use to test blood sugars 3-4 times a day. Apply every 14 days. (Patient not taking: Reported on 4/12/2022) 2 each 6    metFORMIN (GLUCOPHAGE) 1000 MG tablet TAKE 1 TABLET BY MOUTH EVERY DAY WITH BREAKFAST (Patient not taking: Reported on 10/7/2024) 90 tablet 1    Metformin HCl (RIOMET) 500 MG/5ML oral solution Take 10 mL (1,000 mg total) by mouth daily with breakfast (Patient not taking: Reported on 10/7/2024) 300 mL 3     No current facility-administered medications for this visit.     Allergies   Allergen Reactions    Other Swelling     bee       Objective   Vitals: Blood pressure 118/78, pulse 91, height 6' 1\" (1.854 m), weight 86.4 kg (190 lb 6.4 oz).  Invasive Devices       None                   Physical Exam    Anterior neck scar is healed. No palpable thyroid tissue or masses in the neck.  Lungs are clear to auscultation.  Heart exhibits a regular rate and rhythm. No murmurs detected.  No tremor observed " in the outstretched hands. Patellar deep tendon reflexes are normal. Diabetic foot exam shows no lower extremity edema or ulcerations of the feet. Hammertoes are present in the second through fourth toes bilaterally. Dorsalis pedis and posterior tibialis pulses are 2+. Vibration sensation is diminished to the first toe DIP joint bilaterally. Monofilament sensation is intact to both feet.  Patient's shoes and socks removed.    Right Foot/Ankle   Right Foot Inspection  Skin Exam: skin normal and skin intact. No dry skin, no warmth, no callus, no erythema, no maceration, no abnormal color, no pre-ulcer, no ulcer and no callus.     Toe Exam: right toe deformity. No swelling    Sensory   Vibration: diminished  Monofilament testing: intact    Vascular  Capillary refills: < 3 seconds  The right DP pulse is 2+. The right PT pulse is 2+.     Left Foot/Ankle  Left Foot Inspection  Skin Exam: skin normal and skin intact. No dry skin, no warmth, no erythema, no maceration, normal color, no pre-ulcer, no ulcer and no callus.     Toe Exam: left toe deformity. No swelling.     Sensory   Vibration: diminished  Monofilament testing: intact    Vascular  Capillary refills: < 3 seconds  The left DP pulse is 2+. The left PT pulse is 2+.     Assign Risk Category  Deformity present  Loss of protective sensation  No weak pulses  Risk: 2        The history was obtained from the review of the chart and from the patient.    Lab Results:    Most recent Alc is  Lab Results   Component Value Date    HGBA1C 6.0 (H) 10/03/2024           Blood work performed on 10/3/2024 showed a CBC that was normal.      CMP showed a glucose of 131 fasting, sodium 140 with a potassium of 3.7 but was otherwise normal.    TSH is 0.516 with a free T4 of 1.99.    Lab Results   Component Value Date    CREATININE 1.18 10/03/2024    CREATININE 1.29 (H) 04/29/2024    CREATININE 1.33 (H) 12/05/2023    BUN 24 10/03/2024    K 3.7 10/03/2024    CL 96 10/03/2024    CO2 25  10/03/2024     eGFR   Date Value Ref Range Status   10/03/2024 69 >59 mL/min/1.73 Final         Lab Results   Component Value Date    HDL 29 (L) 04/29/2024    TRIG 161 (H) 04/29/2024    CHOLHDL 4.1 04/19/2023       Lab Results   Component Value Date    ALT 11 10/03/2024    AST 16 10/03/2024       Lab Results   Component Value Date    TSH 0.516 10/03/2024    FREET4 1.99 (H) 10/03/2024             Future Appointments   Date Time Provider Department Center   4/7/2025  8:20 AM Iliana Bernabe MD [facility]

## 2025-03-01 DIAGNOSIS — E27.40 ADRENAL INSUFFICIENCY (HCC): ICD-10-CM

## 2025-03-01 DIAGNOSIS — E89.6 H/O TOTAL ADRENALECTOMY (HCC): ICD-10-CM

## 2025-03-03 RX ORDER — HYDROCORTISONE 10 MG/1
TABLET ORAL
Qty: 100 TABLET | Refills: 5 | Status: SHIPPED | OUTPATIENT
Start: 2025-03-03

## 2025-03-23 DIAGNOSIS — E89.0 POSTOPERATIVE HYPOTHYROIDISM: ICD-10-CM

## 2025-03-24 RX ORDER — LEVOTHYROXINE SODIUM 200 UG/1
TABLET ORAL
Qty: 72 TABLET | Refills: 1 | Status: SHIPPED | OUTPATIENT
Start: 2025-03-24

## 2025-04-13 ENCOUNTER — TELEPHONE (OUTPATIENT)
Dept: OTHER | Facility: OTHER | Age: 65
End: 2025-04-13

## 2025-04-13 NOTE — TELEPHONE ENCOUNTER
Patient is calling regarding cancelling an appointment.    Date/Time: 4/14 @ 8:40a    Patient was rescheduled: YES [] NO [x]    Patient requesting call back to reschedule: YES [x] NO []    Please call pt when office reopens.

## 2025-05-06 ENCOUNTER — OFFICE VISIT (OUTPATIENT)
Dept: ENDOCRINOLOGY | Facility: HOSPITAL | Age: 65
End: 2025-05-06
Payer: COMMERCIAL

## 2025-05-06 VITALS
BODY MASS INDEX: 23.19 KG/M2 | WEIGHT: 175 LBS | SYSTOLIC BLOOD PRESSURE: 104 MMHG | OXYGEN SATURATION: 100 % | HEART RATE: 80 BPM | DIASTOLIC BLOOD PRESSURE: 60 MMHG | HEIGHT: 73 IN

## 2025-05-06 DIAGNOSIS — E89.6 H/O TOTAL ADRENALECTOMY (HCC): ICD-10-CM

## 2025-05-06 DIAGNOSIS — E78.2 MIXED HYPERLIPIDEMIA: ICD-10-CM

## 2025-05-06 DIAGNOSIS — E11.65 TYPE 2 DIABETES MELLITUS WITH HYPERGLYCEMIA, WITHOUT LONG-TERM CURRENT USE OF INSULIN (HCC): Primary | ICD-10-CM

## 2025-05-06 DIAGNOSIS — Z86.39 HISTORY OF MULTINODULAR GOITER: ICD-10-CM

## 2025-05-06 DIAGNOSIS — E27.40 ADRENAL INSUFFICIENCY (HCC): ICD-10-CM

## 2025-05-06 DIAGNOSIS — E89.0 POSTOPERATIVE HYPOTHYROIDISM: ICD-10-CM

## 2025-05-06 PROCEDURE — 99214 OFFICE O/P EST MOD 30 MIN: CPT | Performed by: INTERNAL MEDICINE

## 2025-05-06 RX ORDER — METOLAZONE 5 MG/1
TABLET ORAL
COMMUNITY
Start: 2025-04-27

## 2025-05-06 NOTE — PROGRESS NOTES
Name: Sarath Loaiza      : 1960      MRN: [id number]  Encounter Provider: Iliana Bernabe MD  Encounter Date: 2025   Encounter department: [facility] [place]    No chief complaint on file.  :  Assessment & Plan  Type 2 diabetes mellitus with hyperglycemia, without long-term current use of insulin (HCC)    Lab Results   Component Value Date    HGBA1C 6.0 (H) 10/03/2024       Orders:    Comprehensive metabolic panel; Future    CBC and differential; Future    T4, free; Future    TSH, 3rd generation; Future    Hemoglobin A1C; Future    Adrenal insufficiency (HCC)    Orders:    Comprehensive metabolic panel; Future    CBC and differential; Future    T4, free; Future    TSH, 3rd generation; Future    Hemoglobin A1C; Future    Postoperative hypothyroidism    Orders:    Comprehensive metabolic panel; Future    CBC and differential; Future    T4, free; Future    TSH, 3rd generation; Future    Hemoglobin A1C; Future    H/O total adrenalectomy (HCC)    Orders:    Comprehensive metabolic panel; Future    CBC and differential; Future    T4, free; Future    TSH, 3rd generation; Future    Hemoglobin A1C; Future    History of multinodular goiter    Orders:    Comprehensive metabolic panel; Future    CBC and differential; Future    T4, free; Future    TSH, 3rd generation; Future    Hemoglobin A1C; Future    Mixed hyperlipidemia    Orders:    Comprehensive metabolic panel; Future    CBC and differential; Future    T4, free; Future    TSH, 3rd generation; Future    Hemoglobin A1C; Future      Assessment & Plan  1. Type 2 Diabetes Mellitus.  - Significant weight loss from 210 to 175 pounds.  - Improved blood sugar control; off metformin and Jardiance.  - Recent blood work pending review to assess current diabetes status.  - Managing diabetes with a diabetic formula and via tube feeds as needed.    2. Hyperlipidemia.  - Currently on atorvastatin 80 mg daily.  - No new symptoms  reported.  - Cholesterol medication regimen confirmed.  - No changes to current treatment plan.    3. Adrenal Insufficiency post adrenalectomy.  - On Florinef 0.1 mg daily and hydrocortisone 10 mg three times in the morning, with additional doses during stress.  - No new symptoms reported.  - Current regimen confirmed effective.  - No changes to current treatment plan.    4. Hypothyroidism post thyroidectomy.  - On levothyroxine 200 mcg five days a week and half a dose on Saturday and Sunday.  - No new symptoms reported.  - Current regimen confirmed effective.  - No changes to current treatment plan.    Follow-up: The patient will follow up in 6 months with preceding CMP, CBC, hemoglobin A1c, TSH, and free T4.      History of Present Illness   History of Present Illness  The patient is a 64-year-old male with a history of type 2 diabetes diagnosed 7 to 10 years ago, hypothyroidism post-thyroidectomy, adrenal insufficiency post-bilateral adrenalectomy, and hyperlipidemia, here for a follow-up visit.    Diabetes complications include neuropathy and a history of heart attack, but he reports no nephropathy, retinopathy, stroke, or claudication.     He has discontinued metformin and Jardiance due to significant weight loss and has been managing his diabetes with a specialized diabetic formula. His current weight is 175 pounds, down from 210 pounds. He reports frequent urination at night, necessitating 3 bathroom visits. He always keeps water by his bed. He reports minimal appetite and no nausea or blurry vision. He had an eye exam within the last year. He does not experience numbness or tingling in his feet and has not consulted a podiatrist. He reports no palpitations or tremors. He occasionally experiences diarrhea, for which he takes medication as needed. He sleeps in a chair due to a history of congestive heart failure, which caused breathing difficulties when lying down. He experiences lightheadedness upon standing  quickly and fatigue, requiring him to schedule rest periods throughout the day. He has a gastric tube in place, which he uses occasionally for medication administration.    He is on atorvastatin 80 mg daily for cholesterol management. Bisoprolol is taken once daily except half on Sunday for blood pressure management. Florinef 0.1 mg daily and hydrocortisone 10 mg 3 times in the morning are taken for adrenal insufficiency. He occasionally takes an extra dose of hydrocortisone during periods of stress. Levothyroxine 200 mcg is taken 5 days a week and half on Saturday and Sunday for hypothyroidism. Metolazone and a potassium pill are taken for fluid management, along with furosemide.    He has a history of thyroid nodules and is post-thyroidectomy. He has been off cancer treatment since 12/2024. He did not have surgery but underwent radiation and chemotherapy. His second PET scan showed no cancer anywhere out of the 3 different spots. He had the flu for a bit and was getting hot and cold, but that went away. He got tested twice and ended up back at the hospital because of dehydration and his tube needed to be fixed. He sometimes experiences dry skin, which he manages with cream application.    Sarath Loaiza is a 64 y.o. male with type 2 diabetes seen in follow up. Reports complications of neuropathy. Denies recent severe hypoglycemic or severe hyperglycemic episodes. Denies any issues with his current regimen. Last A1C was 6%. Denies recent illness, hospitalization or steroid use.     Home blood glucometer readings:   He tests blood sugars infrequently.    Current regimen:   For his adrenal insufficiency, he takes hydrocortisone 10 mg 3 daily and extra as needed  And Florinef 0.1 mg daily.    He takes atorvastatin 80 mg daily for hyperlipidemia.    He takes levothyroxine 200 mcg 5 days a week, half on Saturday and none on Sunday for his hypothyroidism.      Last Eye Exam: In 2024  Last Foot Exam: 10/07/2024  [facility]  Maintenance   Topic Date Due    Diabetic Eye Exam  Never done    Diabetic Foot Exam  10/07/2025     Pertinent Medical History   The patient is a 64-year-old male with a history of type 2 diabetes diagnosed 7 to 10 years ago, hypothyroidism post-thyroidectomy, adrenal insufficiency post-bilateral adrenalectomy, and hyperlipidemia, here for a follow-up visit.    Diabetes complications include neuropathy and a history of heart attack, but he reports no nephropathy, retinopathy, stroke, or claudication.         Review of Systems as per HPI    Medical History Reviewed by provider this encounter:  Tobacco  Allergies  Meds  Problems  Med Hx  Surg Hx  Fam Hx     .  Current Outpatient Medications on File Prior to Visit   Medication Sig Dispense Refill    albuterol (PROVENTIL HFA,VENTOLIN HFA) 90 mcg/act inhaler As needed      atorvastatin (LIPITOR) 40 mg tablet Take 40 mg by mouth daily (Patient taking differently: Take 80 mg by mouth daily)      bisoprolol (ZEBETA) 5 mg tablet Take 2.5 mg by mouth daily (Patient taking differently: Take 5 mg by mouth daily And only half a tablet on Sunday)      fludrocortisone (FLORINEF) 0.1 mg tablet TAKE 1 TABLET BY MOUTH EVERY DAY 30 tablet 14    furosemide (LASIX) 40 mg tablet Take 80 mg by mouth 2 (two) times a day (Patient taking differently: Take 80 mg by mouth daily)      hydrocortisone (CORTEF) 10 mg tablet TAKE 3 TABLETS DAILY (MAY DOUBLE IF ILL) 100 tablet 5    hydrocortisone (Solu-CORTEF) 100 mg SOLR 100 mg IV hydrocortisone on call for  procedure 10 mL 0    levothyroxine 200 mcg tablet TAKE 1 TABLET BY MOUTH DAILY 5 DAYS OF THE WEEK. TAKE 1/2 TABLET ON SAT AND NONE ON SUN 72 tablet 1    metolazone (ZAROXOLYN) 5 mg tablet       nitroglycerin (NITROSTAT) 0.4 mg SL tablet Place 0.4 mg under the tongue      potassium chloride (KLOR-CON) 20 mEq packet TAKE 2 PACKETS BY MOUTH DAILY (Patient taking differently: Take 20 mEq by mouth daily)      sildenafil (VIAGRA) 50 MG tablet  "Take 100 mg by mouth as needed      Aspirin Buf,CaCarb-MgCarb-MgO, 81 MG TABS Take 81 mg by mouth daily (Patient not taking: Reported on 2025)      Continuous Blood Gluc Sensor (FreeStyle Krishna 14 Day Sensor) [facility] Use to test blood sugars 3-4 times a day. Apply every 14 days. (Patient not taking: Reported on 2022) 2 each 6     No current facility-administered medications on file prior to visit.      Social History     Tobacco Use    Smoking status: Former     Current packs/day: 0.00     Types: Cigarettes     Start date:      Quit date:      Years since quittin.3    Smokeless tobacco: Never   Vaping Use    Vaping status: Never Used   Substance and Sexual Activity    Alcohol use: Yes    Drug use: Never    Sexual activity: Not on file        Medical History Reviewed by provider this encounter:  Tobacco  Allergies  Meds  Problems  Med Hx  Surg Hx  Fam Hx     .    Objective   /60   Pulse 80   Ht 6' 1\" (1.854 m)   Wt 79.4 kg (175 lb)   SpO2 100%   BMI 23.09 kg/m²      Body mass index is 23.09 kg/m².  Wt Readings from Last 3 Encounters:   25 79.4 kg (175 lb)   10/07/24 86.4 kg (190 lb 6.4 oz)   24 95.3 kg (210 lb)   Physical Exam    Physical Exam  Vitals and nursing note reviewed.   Constitutional:       General: He is not in acute distress.     Appearance: Normal appearance. He is well-developed.   HENT:      Head: Normocephalic and atraumatic.   Eyes:      Conjunctiva/sclera: Conjunctivae normal.      Comments: No lid lag, stare, proptosis, or periorbital edema.   Neck:      Vascular: No carotid bruit.      Comments: Healed anterior neck scar.  No palpable thyroid tissue.  No lymphadenopathy.  Cardiovascular:      Rate and Rhythm: Normal rate and regular rhythm.      Heart sounds: Normal heart sounds. No murmur heard.  Pulmonary:      Effort: Pulmonary effort is normal. No respiratory distress.      Breath sounds: Normal breath sounds. No wheezing.   Abdominal:      " "Palpations: Abdomen is soft.      Comments: Gastric tube in place.   Musculoskeletal:         General: No swelling.      Cervical back: Neck supple.      Right lower leg: No edema.      Left lower leg: No edema.      Comments: No tremor of the outstretched hands.   Lymphadenopathy:      Cervical: No cervical adenopathy.   Skin:     General: Skin is warm and dry.      Capillary Refill: Capillary refill takes less than 2 seconds.      Comments: No hyperpigmentation of the palmar creases.   Neurological:      Mental Status: He is alert and oriented to person, place, and time.      Comments: Patellar deep tendon reflexes normal.   Psychiatric:         Mood and Affect: Mood normal.       Results      Labs:   Lab Results   Component Value Date    HGBA1C 6.0 (H) 10/03/2024    HGBA1C 6.9 (H) 04/29/2024    HGBA1C 9.2 (H) 12/05/2023     BMP performed on 4/8/2025 showed a sodium of 134 with a potassium of 3.6.  Chloride is 87 with a CO2 of 35.  Kos was 84.    Lab Results   Component Value Date    CREATININE 0.89 04/08/2025    CREATININE 0.88 04/07/2025    CREATININE 0.95 04/07/2025    BUN 29 (H) 04/08/2025    K 3.6 04/08/2025    CL 87 (L) 04/08/2025    CO2 35 (H) 04/08/2025     eGFRcr   Date Value Ref Range Status   04/06/2025 76 >=60 mL/min/1.73 m2 Final     Comment:     Estimated glomerular filtration rate (eGFR) is calculated without a race  coefficient. Values should be interpreted in the context of the patient's full  clinical presentation. Reference: Hiral Mclaughlin et al. \"A unifying approach  for GFR estimation: recommendations of the NKF-ASN task force on reassessing the  inclusion of race in diagnosing kidney disease.\" American Journal of Kidney  Diseases (2021)     Lab Results   Component Value Date    HDL 29 (L) 04/29/2024    TRIG 161 (H) 04/29/2024    CHOLHDL 4.1 04/19/2023     Lab Results   Component Value Date    ALT 14 (L) 04/08/2025    AST 23 04/08/2025    ALKPHOS 84 04/08/2025         Patient Instructions "   We'll await the blood work results done at [facility] this morning.     For now, no change in levothyroxine, florinef, and hydrocortisone.     Follow up in 6 months with blood work.     Discussed with the patient and all questioned fully answered. He will call me if any problems arise.

## 2025-05-06 NOTE — ASSESSMENT & PLAN NOTE
Lab Results   Component Value Date    HGBA1C 6.0 (H) 10/03/2024       Orders:    Comprehensive metabolic panel; Future    CBC and differential; Future    T4, free; Future    TSH, 3rd generation; Future    Hemoglobin A1C; Future

## 2025-05-06 NOTE — ASSESSMENT & PLAN NOTE
Orders:    Comprehensive metabolic panel; Future    CBC and differential; Future    T4, free; Future    TSH, 3rd generation; Future    Hemoglobin A1C; Future

## 2025-05-06 NOTE — PATIENT INSTRUCTIONS
We'll await the blood work results done at [facility] this morning.     For now, no change in levothyroxine, florinef, and hydrocortisone.     Follow up in 6 months with blood work.

## 2025-05-09 ENCOUNTER — RESULTS FOLLOW-UP (OUTPATIENT)
Dept: ENDOCRINOLOGY | Facility: HOSPITAL | Age: 65
End: 2025-05-09

## 2025-05-09 LAB
ALBUMIN SERPL-MCNC: 4.5 G/DL (ref 3.9–4.9)
ALBUMIN/CREAT UR: 29 MG/G CREAT (ref 0–29)
ALP SERPL-CCNC: 124 IU/L (ref 44–121)
ALT SERPL-CCNC: 19 IU/L (ref 0–44)
AST SERPL-CCNC: 25 IU/L (ref 0–40)
BILIRUB SERPL-MCNC: 1.5 MG/DL (ref 0–1.2)
BUN SERPL-MCNC: 61 MG/DL (ref 8–27)
BUN/CREAT SERPL: 51 (ref 10–24)
CALCIUM SERPL-MCNC: 9.6 MG/DL (ref 8.6–10.2)
CHLORIDE SERPL-SCNC: 77 MMOL/L (ref 96–106)
CHOLEST SERPL-MCNC: 168 MG/DL (ref 100–199)
CO2 SERPL-SCNC: 27 MMOL/L (ref 20–29)
CREAT SERPL-MCNC: 1.19 MG/DL (ref 0.76–1.27)
CREAT UR-MCNC: 57.5 MG/DL
EGFR: 68 ML/MIN/1.73
EST. AVERAGE GLUCOSE BLD GHB EST-MCNC: 134 MG/DL
GLOBULIN SER-MCNC: 3.6 G/DL (ref 1.5–4.5)
GLUCOSE SERPL-MCNC: 272 MG/DL (ref 70–99)
HBA1C MFR BLD: 6.3 % (ref 4.8–5.6)
HDLC SERPL-MCNC: 41 MG/DL
LDLC SERPL CALC-MCNC: 102 MG/DL (ref 0–99)
LDLC/HDLC SERPL: 2.5 RATIO (ref 0–3.6)
MICROALBUMIN UR-MCNC: 16.4 UG/ML
POTASSIUM SERPL-SCNC: 3.9 MMOL/L (ref 3.5–5.2)
PROT SERPL-MCNC: 8.1 G/DL (ref 6–8.5)
SL AMB VLDL CHOLESTEROL CALC: 25 MG/DL (ref 5–40)
SODIUM SERPL-SCNC: 131 MMOL/L (ref 134–144)
T4 FREE SERPL-MCNC: 1.94 NG/DL (ref 0.82–1.77)
TRIGL SERPL-MCNC: 142 MG/DL (ref 0–149)
TSH SERPL DL<=0.005 MIU/L-ACNC: 2.39 UIU/ML (ref 0.45–4.5)

## 2025-06-14 DIAGNOSIS — E27.40 ADRENAL INSUFFICIENCY (HCC): ICD-10-CM

## 2025-06-16 RX ORDER — FLUDROCORTISONE ACETATE 0.1 MG/1
0.1 TABLET ORAL DAILY
Qty: 90 TABLET | Refills: 3 | Status: SHIPPED | OUTPATIENT
Start: 2025-06-16

## 2025-07-17 DIAGNOSIS — E89.0 POSTOPERATIVE HYPOTHYROIDISM: ICD-10-CM

## 2025-07-17 RX ORDER — LEVOTHYROXINE SODIUM 200 UG/1
TABLET ORAL
Qty: 72 TABLET | Refills: 1 | Status: ON HOLD | OUTPATIENT
Start: 2025-07-17